# Patient Record
Sex: MALE | Employment: OTHER | URBAN - NONMETROPOLITAN AREA
[De-identification: names, ages, dates, MRNs, and addresses within clinical notes are randomized per-mention and may not be internally consistent; named-entity substitution may affect disease eponyms.]

---

## 2022-04-18 PROBLEM — E79.0 HYPERURICEMIA: Status: ACTIVE | Noted: 2018-11-15

## 2022-04-18 PROBLEM — H35.3220 EXUDATIVE AGE-RELATED MACULAR DEGENERATION OF LEFT EYE (HCC): Status: ACTIVE | Noted: 2019-04-12

## 2022-04-18 PROBLEM — C61 MALIGNANT NEOPLASM OF PROSTATE (HCC): Status: ACTIVE | Noted: 2020-10-06

## 2022-04-18 PROBLEM — R73.01 IFG (IMPAIRED FASTING GLUCOSE): Status: ACTIVE | Noted: 2021-01-04

## 2022-04-18 PROBLEM — I63.9 CVA (CEREBRAL VASCULAR ACCIDENT) (HCC): Status: ACTIVE | Noted: 2018-11-15

## 2022-04-18 PROBLEM — H40.1131 PRIMARY OPEN ANGLE GLAUCOMA (POAG) OF BOTH EYES, MILD STAGE: Status: ACTIVE | Noted: 2021-01-04

## 2022-04-18 PROBLEM — N18.4 STAGE 4 CHRONIC KIDNEY DISEASE (HCC): Status: ACTIVE | Noted: 2018-11-13

## 2022-04-18 PROBLEM — N40.1 BPH WITH OBSTRUCTION/LOWER URINARY TRACT SYMPTOMS: Status: ACTIVE | Noted: 2020-07-02

## 2022-04-18 PROBLEM — R06.09 DOE (DYSPNEA ON EXERTION): Status: ACTIVE | Noted: 2022-02-22

## 2022-04-18 PROBLEM — E78.2 MIXED HYPERLIPIDEMIA: Status: ACTIVE | Noted: 2018-06-07

## 2022-04-18 PROBLEM — I12.9 HYPERTENSIVE CHRONIC KIDNEY DISEASE WITH STAGE 1 THROUGH STAGE 4 CHRONIC KIDNEY DISEASE, OR UNSPECIFIED CHRONIC KIDNEY DISEASE: Status: ACTIVE | Noted: 2022-02-11

## 2022-04-18 PROBLEM — D50.8 IRON DEFICIENCY ANEMIA SECONDARY TO INADEQUATE DIETARY IRON INTAKE: Status: ACTIVE | Noted: 2022-02-25

## 2022-04-18 PROBLEM — G89.18 ACUTE POST-OPERATIVE PAIN: Status: ACTIVE | Noted: 2018-11-13

## 2022-04-18 PROBLEM — J43.2 CENTRILOBULAR EMPHYSEMA (HCC): Status: ACTIVE | Noted: 2022-02-22

## 2022-04-18 PROBLEM — D50.9 ANEMIA, IRON DEFICIENCY: Status: ACTIVE | Noted: 2022-02-18

## 2022-04-18 PROBLEM — H35.30 MACULAR DEGENERATION: Status: ACTIVE | Noted: 2018-11-15

## 2022-04-18 PROBLEM — R97.20 ELEVATED PROSTATE SPECIFIC ANTIGEN (PSA): Status: ACTIVE | Noted: 2020-07-02

## 2022-04-18 PROBLEM — J90 PLEURAL EFFUSION: Status: ACTIVE | Noted: 2022-02-22

## 2022-04-18 PROBLEM — G47.20 SLEEP PATTERN DISTURBANCE: Status: ACTIVE | Noted: 2021-01-04

## 2022-04-18 PROBLEM — I63.9 CEREBELLAR STROKE (HCC): Status: ACTIVE | Noted: 2021-01-04

## 2022-04-18 PROBLEM — F17.200 CURRENT SMOKER: Status: ACTIVE | Noted: 2018-11-15

## 2022-04-18 PROBLEM — I65.23 BILATERAL CAROTID ARTERY STENOSIS: Status: ACTIVE | Noted: 2021-01-04

## 2022-04-18 PROBLEM — H25.13 AGE-RELATED NUCLEAR CATARACT OF BOTH EYES: Status: ACTIVE | Noted: 2021-01-04

## 2022-04-18 PROBLEM — R60.0 EDEMA, LOWER EXTREMITY: Status: ACTIVE | Noted: 2021-11-23

## 2022-04-18 PROBLEM — D63.1 ANEMIA OF RENAL DISEASE: Status: ACTIVE | Noted: 2022-02-18

## 2022-04-18 PROBLEM — I71.40 AAA (ABDOMINAL AORTIC ANEURYSM) WITHOUT RUPTURE (HCC): Status: ACTIVE | Noted: 2018-10-12

## 2022-04-18 PROBLEM — E78.5 HYPERLIPIDEMIA: Status: ACTIVE | Noted: 2018-11-15

## 2022-04-18 PROBLEM — I73.9 PAD (PERIPHERAL ARTERY DISEASE) (HCC): Status: ACTIVE | Noted: 2018-11-15

## 2022-04-18 PROBLEM — N18.9 ANEMIA OF RENAL DISEASE: Status: ACTIVE | Noted: 2022-02-18

## 2022-04-18 PROBLEM — I10 ESSENTIAL HYPERTENSION: Status: ACTIVE | Noted: 2018-11-13

## 2022-04-18 PROBLEM — N13.8 BPH WITH OBSTRUCTION/LOWER URINARY TRACT SYMPTOMS: Status: ACTIVE | Noted: 2020-07-02

## 2022-04-18 PROBLEM — J98.11 ATELECTASIS: Status: ACTIVE | Noted: 2018-11-14

## 2022-04-18 RX ORDER — TAMSULOSIN HYDROCHLORIDE 0.4 MG/1
0.4 CAPSULE ORAL DAILY
COMMUNITY
Start: 2021-08-24

## 2022-04-18 RX ORDER — ATORVASTATIN CALCIUM 40 MG/1
40 TABLET, FILM COATED ORAL DAILY
COMMUNITY
Start: 2022-02-21

## 2022-04-18 RX ORDER — ASPIRIN 81 MG/1
81 TABLET, CHEWABLE ORAL
COMMUNITY

## 2022-04-18 RX ORDER — OLMESARTAN MEDOXOMIL, AMLODIPINE AND HYDROCHLOROTHIAZIDE TABLET 20/5/12.5 MG 20; 5; 12.5 MG/1; MG/1; MG/1
1 TABLET ORAL DAILY
COMMUNITY
Start: 2022-02-21 | End: 2022-04-21

## 2022-04-18 RX ORDER — LATANOPROST 50 UG/ML
SOLUTION/ DROPS OPHTHALMIC
COMMUNITY
Start: 2021-10-07

## 2022-04-18 RX ORDER — NEBIVOLOL 20 MG/1
20 TABLET ORAL DAILY
COMMUNITY
Start: 2021-12-20

## 2022-04-18 RX ORDER — FEBUXOSTAT 40 MG/1
40 TABLET, FILM COATED ORAL DAILY
COMMUNITY
Start: 2022-02-21

## 2022-04-18 RX ORDER — CLOPIDOGREL BISULFATE 75 MG/1
75 TABLET ORAL DAILY
COMMUNITY
Start: 2022-02-21

## 2022-04-21 ENCOUNTER — OFFICE VISIT (OUTPATIENT)
Dept: NEPHROLOGY | Age: 67
End: 2022-04-21
Payer: COMMERCIAL

## 2022-04-21 ENCOUNTER — TELEPHONE (OUTPATIENT)
Dept: NEPHROLOGY | Age: 67
End: 2022-04-21

## 2022-04-21 VITALS
TEMPERATURE: 98.2 F | SYSTOLIC BLOOD PRESSURE: 142 MMHG | BODY MASS INDEX: 25.62 KG/M2 | HEIGHT: 71 IN | OXYGEN SATURATION: 98 % | HEART RATE: 70 BPM | DIASTOLIC BLOOD PRESSURE: 64 MMHG | WEIGHT: 183 LBS

## 2022-04-21 DIAGNOSIS — N18.5 CKD (CHRONIC KIDNEY DISEASE), STAGE V (HCC): Primary | ICD-10-CM

## 2022-04-21 PROCEDURE — 99204 OFFICE O/P NEW MOD 45 MIN: CPT | Performed by: INTERNAL MEDICINE

## 2022-04-21 RX ORDER — TORSEMIDE 20 MG/1
40 TABLET ORAL DAILY
Qty: 60 TABLET | Refills: 3 | Status: SHIPPED | OUTPATIENT
Start: 2022-04-21 | End: 2022-05-23 | Stop reason: SDUPTHER

## 2022-04-21 RX ORDER — FUROSEMIDE 40 MG/1
40 TABLET ORAL DAILY
COMMUNITY
End: 2022-04-21

## 2022-04-21 RX ORDER — AMLODIPINE BESYLATE 10 MG/1
10 TABLET ORAL DAILY
COMMUNITY
Start: 2022-03-31

## 2022-04-21 NOTE — TELEPHONE ENCOUNTER
Forest Davis would like for you to send that other water pill in for Kalen Gagandeep so he has it at home if he needs it over the weekend.

## 2022-04-21 NOTE — PROGRESS NOTES
[address] [facility]  [address]  Dept: [phone]  Loc: [phone]  Outpatient Consult  [phone]  4/21/2022 1:39 PM EDT        Pt Name:    Paulette Velasco  YOB: 1955  Primary Care Physician:  Sam Booth MD     Chief Complaint:   Chief Complaint   Patient presents with   Martha American Patient     Ref Dr. Landy Davis         Background Information/Interval History:   The patient is a pleasant 74y.o. year old  male referred by Dr. Landy Davis for CKD V. [place] [facility] [place] [facility] [place] then [facility]. Creatinine has been ranging in the 4s since January and has been progressively worsening over the past several months. He has a history of chronic diastolic CHF, anemia, prostate CA s/p RT November 2011, carotid artery stenosis s/p endarterectomy 2012, and AAA s/p EVAR in 2018. He had previous duplex renal artery US which showed no renal artery stenosis. Kidneys normal size with multiple renal cysts B/L. Past urine studies showed Upc 3 grams with 3-5 RBCs. Kidney function has been worsening since his radiation treatment in November. He has been having issues with swelling and is on diuretics. He initially responded well to lasix but then stopped responding. He was started on Torsemide by his PCP recently at 20 mg daily. He took 20 mg BID x 3 days. Hasn't noticed much improvement and doesn't notice much increase in urination after taking it. He has orthopnea and sleeps in a recliner. Allergies:  Seasonal and Varenicline        Past Medical History:  No past medical history on file. Past Surgical History:  No past surgical history on file. Family History:  No family history on file.      Social History:  Social History     Socioeconomic History    Marital status: Unknown     Spouse name: Not on file    Number of children: Not on file    Years of education: Not on file    Highest education level: Not on file   Occupational History    Not on file   Tobacco Use    Smoking status: Not on file    Smokeless tobacco: Not on file   Substance and Sexual Activity    Alcohol use: Not on file    Drug use: Not on file    Sexual activity: Not on file   Other Topics Concern    Not on file   Social History Narrative    Not on file     Social Determinants of Health     Financial Resource Strain:     Difficulty of Paying Living Expenses: Not on file   Food Insecurity:     Worried About Running Out of Food in the Last Year: Not on file    [place]ana of Food in the Last Year: Not on file   Transportation Needs:     Lack of Transportation (Medical): Not on file    Lack of Transportation (Non-Medical):  Not on file   Physical Activity:     Days of Exercise per Week: Not on file    Minutes of Exercise per Session: Not on file   Stress:     Feeling of Stress : Not on file   Social Connections:     Frequency of Communication with Friends and Family: Not on file    Frequency of Social Gatherings with Friends and Family: Not on file    Attends Confucianist Services: Not on file    Active Member of [address] [place] [address] or Organizations: Not on file    Attends Club or Organization Meetings: Not on file    Marital Status: Not on file   Intimate Partner Violence:     Fear of Current or Ex-Partner: Not on file    Emotionally Abused: Not on file    Physically Abused: Not on file    Sexually Abused: Not on file   Housing Stability:     Unable to Pay for Housing in the Last Year: Not on file    Number of [place] in the Last Year: Not on file    Unstable Housing in the Last Year: Not on file        Review of Systems:  Constitutional: no fever or chills  Head: No headaches  Eyes: no blurry vision, no discharge  Ears: no ear pain or hearing changes  Nose: no runny nose or epistaxis  Respiratory: +shortness of breath  Cardiovascular: no chest pain, + edema  GI: no nausea, no vomiting or diarrhea  : denies any hematuria, no flank pain  Skin: no rash  Musculoskeletal: no joint pain, moves all ext  Neuro: no tremor, no slurred speech  Psychiatric: stable mood, no depression or insomnia     Home Medications:  Prior to Admission medications    Medication Sig Start Date End Date Taking?  Authorizing Provider   amLODIPine (NORVASC) 10 MG tablet Take 10 mg by mouth daily 3/31/22  Yes Historical Provider, MD   aspirin 81 MG chewable tablet Take 81 mg by mouth   Yes Historical Provider, MD   atorvastatin (LIPITOR) 40 MG tablet Take 40 mg by mouth daily 2/21/22  Yes Historical Provider, MD   Cholecalciferol (VITAMIN D) 10 MCG (400 UNIT) CAPS Capsule Take 400 Units by mouth daily   Yes Historical Provider, MD   clopidogrel (PLAVIX) 75 MG tablet Take 75 mg by mouth daily 2/21/22  Yes Historical Provider, MD   febuxostat (ULORIC) 40 MG TABS tablet Take 40 mg by mouth daily 2/21/22  Yes Historical Provider, MD   latanoprost (XALATAN) 0.005 % ophthalmic solution  10/7/21  Yes Historical Provider, MD   MULTIPLE VITAMINS-MINERALS PO Take 1 tablet by mouth 2 times daily   Yes Historical Provider, MD   nebivolol (BYSTOLIC) 20 MG TABS tablet Take 20 mg by mouth daily 12/20/21  Yes Historical Provider, MD   tamsulosin (FLOMAX) 0.4 MG capsule Take 0.4 mg by mouth daily 8/24/21  Yes Historical Provider, MD   iron sucrose (VENOFER) 20 MG/ML injection Infuse 200 mg intravenously once a week 3/4/22  Yes Historical Provider, MD   furosemide (LASIX) 40 MG tablet Take 40 mg by mouth daily    Historical Provider, MD   Ferrous Fumarate 325 (106 Fe) MG TABS Take by mouth  Patient not taking: Reported on 4/21/2022    Historical Provider, MD   olmesartan-amLODIPine-HCTZ 20-5-12.5 MG TABS Take 1 tablet by mouth daily  Patient not taking: Reported on 4/21/2022 2/21/22   Historical Provider, MD        Physical Examination:  VITALS:  BP (!) 142/64 (Site: Left Upper Arm, Position: Sitting, Cuff Size: Large Adult)   Pulse 70   Temp 98.2 °F (36.8 °C) (Temporal) Ht 5' 11\" (1.803 m)   Wt 183 lb (83 kg)   SpO2 98%   BMI 25.52 kg/m²   Body mass index is 25.52 kg/m². Wt Readings from Last 3 Encounters:   04/21/22 183 lb (83 kg)     Constitutional and General Appearance: alert and cooperative with exam, appears comfortable, no distress  Eyes: no icteric sclera, no pallor conjunctiva, no discharge seen from either eye  Ears and Nose: normal external appearance of left and right ear and nose. No active drainage from nose. Oral: moist oral mucus membranes  Neck: No jugular venous distention, appears symmetric, good ROM  Lungs: Air entry B/L, no crackles or rales, no use of accessory muscles  Heart: regular rate, S1, S2  Extremities: 1+ LE edema  GI: soft, non-tender, +left sided abdominal bulge  Skin: no rash seen on exposed extremities  Musculo: moves all extremities  Neuro: no slurred speech, no facial drooping, no asterixis  Psychiatric: Awake, alert, Oriented     Laboratory & Diagnostics:  CBC: No results found for: WBC, HGB, HCT, MCV, PLT  BMP:  No results found for: NA, K, CL, CO2, BUN, CREATININE, GLUCOSE   Hepatic: No results found for: AST, ALT, ALB, BILITOT, ALKPHOS  BNP: No results found for: BNP  Lipids: No results found for: CHOL, HDL  INR: No results found for: INR  URINE: No results found for: NAUR, PROTUR  No results found for: NITRU, COLORU, PHUR, LABCAST, WBCUA, RBCUA, MUCUS, TRICHOMONAS, YEAST, BACTERIA, CLARITYU, SPECGRAV, LEUKOCYTESUR, UROBILINOGEN, BILIRUBINUR, BLOODU, GLUCOSEU, KETUA, AMORPHOUS   Microalbumen/Creatinine ratio:  No components found for: RUCREAT        Impression/Plan:   1. CKD V with 3 grams proteinuria  -he is not diabetic. Suspect he has an underlying chronic GN. Has never had a kidney biopsy but at this point is probably too late in his disease course for a biopsy to change the course of treatment. Will send serologies  -will refer patient for transplant evaluation and also dialysis education. He is nearing dialysis.  Would be a good candidate for PD    2. Volume overload: I think he needs higher dose of loop diuretic for effect. Increase torsemide to 40 mg daily. Call in 1 week   3. Anemia: received IV iron, Tsat was 8%. Has not had colonoscopy. Also received Aranesp injections last Hgb was 10.4 did not need. Check labs every 2 weeks  4. HTN  5. AAA, KAIDEN  6. Hx prostate CA    Return in 6 weeks. Thought process was discussed with the patient.   Thank you for the referral.  Please do not hesitate to contact me if you have any questions or concerns        Melva Hanna, DO  [facility]

## 2022-04-25 ENCOUNTER — TELEPHONE (OUTPATIENT)
Dept: NEPHROLOGY | Age: 67
End: 2022-04-25

## 2022-04-25 NOTE — TELEPHONE ENCOUNTER
Stephanie Jaimes (wife) called she said that Ramon Begun is about the same as when you seen him on Thursday with the Torsemide. His swelling did not go away. It goes down at night but comes back during the day. His breathing is a little better but not really. Should he just keep trying the torsemide or are you going to send in the other medication that you had talked about on Thursday?

## 2022-04-25 NOTE — TELEPHONE ENCOUNTER
Is he peeing more with the 40 mg dose?   For now I want him to take it as 40 mg BID, call at end of the week with an update

## 2022-04-29 ENCOUNTER — TELEPHONE (OUTPATIENT)
Dept: NEPHROLOGY | Age: 67
End: 2022-04-29

## 2022-04-29 RX ORDER — METOLAZONE 5 MG/1
5 TABLET ORAL DAILY
Qty: 10 TABLET | Refills: 0 | Status: SHIPPED | OUTPATIENT
Start: 2022-04-29 | End: 2022-06-06

## 2022-04-29 NOTE — TELEPHONE ENCOUNTER
Would like to know weights and if he is urinating more with the higher dose of torsemide. Will give metolazone 5 mg daily to take x 2 days.    Will send to pharmacy and give him 10 tablets but for now just take it for 2 days

## 2022-04-29 NOTE — TELEPHONE ENCOUNTER
I did ask for weight and all she said was he was down a pound. I advised her to have him do daily weights. Shalini Bowen said she does not know if he has really urinated more. I did inform her to take metolazone 5 mg for 2 days only and call me on Monday with a log of weights.  Send to [facility]

## 2022-04-29 NOTE — TELEPHONE ENCOUNTER
Wife called with update on how patient was doing with the torsemide. Breathing is better. LLE still swollen and has a hard time putting shoes on.

## 2022-05-02 NOTE — TELEPHONE ENCOUNTER
Wife called in regards to weights:  4/29 175.5  4/28 174  5/01 171.8  5/02 168.8    She states pt is breathing better. His swelling is down and he can now get his shoes on. She states pt is still taking torsemide 40 mg twice a day. Do you want to do anything differently?

## 2022-05-05 ENCOUNTER — TELEPHONE (OUTPATIENT)
Dept: NEPHROLOGY | Age: 67
End: 2022-05-05

## 2022-05-05 DIAGNOSIS — N18.5 CKD (CHRONIC KIDNEY DISEASE), STAGE V (HCC): Primary | ICD-10-CM

## 2022-05-05 LAB
BUN BLDV-MCNC: 103 MG/DL
BUN BLDV-MCNC: 103 MG/DL (ref 7–22)
CALCIUM SERPL-MCNC: 8.9 MG/DL
CALCIUM SERPL-MCNC: 8.9 MG/DL (ref 8.4–10.2)
CHLORIDE BLD-SCNC: 89 MEQ/L (ref 98–107)
CHLORIDE BLD-SCNC: 89 MMOL/L
CO2: 30 MEQ/L (ref 22–31)
CO2: 30 MMOL/L
CREAT SERPL-MCNC: 5 MG/DL
CREAT SERPL-MCNC: 5 MG/DL (ref 0.4–1.1)
GFR CALCULATED: 12
GFR SERPL CREATININE-BSD FRML MDRD: 12 ML/MIN/{1.73_M2}
GLUCOSE BLD-MCNC: 102 MG/DL
GLUCOSE: 102 MG/DL (ref 70–126)
HCT VFR BLD CALC: 30.7 % (ref 42–52)
HEMOGLOBIN: 10.3 G/DL (ref 14–18)
MCH RBC QN AUTO: 28.4 PG (ref 28–32)
MCHC RBC AUTO-ENTMCNC: 33.7 G/DL (ref 33–37)
MCV RBC AUTO: 84.3 FL (ref 80–94)
PDW BLD-RTO: 15.9 % (ref 11.5–14.5)
PLATELET # BLD: 242 THOU/CUMM (ref 130–400)
POTASSIUM SERPL-SCNC: 4.4 MEQ/L (ref 3.5–5.1)
POTASSIUM SERPL-SCNC: 4.4 MMOL/L
RBC: 3.64 MIL/CUMM (ref 4.7–6.1)
SODIUM BLD-SCNC: 131 MEQ/L (ref 136–145)
SODIUM BLD-SCNC: 131 MMOL/L
WBC: 6.1 THOU/CUMM (ref 4.8–10.8)

## 2022-05-05 NOTE — TELEPHONE ENCOUNTER
----- Message from Jazmin Lo DO sent at 5/5/2022  4:09 PM EDT -----  We need to back off the diuretics for now. Creatinine up to 5 and BUN in 100s. Reduce Torsemide to daily.    Bmp next week  ----- Message -----  From: Brian Ruiz In [facility]  Sent: 5/5/2022   1:19 PM EDT  To: Jazmin Lo DO

## 2022-05-11 ENCOUNTER — TELEPHONE (OUTPATIENT)
Dept: NEPHROLOGY | Age: 67
End: 2022-05-11

## 2022-05-11 LAB
BUN BLDV-MCNC: 98 MG/DL
CALCIUM SERPL-MCNC: 9.2 MG/DL
CHLORIDE BLD-SCNC: 97 MMOL/L
CO2: 27 MMOL/L
CREAT SERPL-MCNC: 4.8 MG/DL
GFR CALCULATED: 12
GLUCOSE BLD-MCNC: 104 MG/DL
POTASSIUM SERPL-SCNC: 4.8 MMOL/L
SODIUM BLD-SCNC: 133 MMOL/L

## 2022-05-11 NOTE — TELEPHONE ENCOUNTER
Mila Inman from [facility] phoned. Prior to us seeing this patient Dr. Sin Veliz was ordering Aranesp, using standing orders. He had labs today, Hgb 10, Hct 29.9 and is scheduled for blood work again on 5.25.22. Now that patient has you as a nephrologist they want to make sure you will be the one to follow his labs and order Aranesp when needed. His next appt with you is 6.6.22  Komal's phone # [phone] ext.  [phone]

## 2022-05-11 NOTE — TELEPHONE ENCOUNTER
Spoke to eCur[employer] at Dr. Bautista Linear office and patient had been getting 60 mcg q 2 weeks if hgb was below 10. I did inform her we will manage aranesp.

## 2022-05-12 DIAGNOSIS — D63.1 ANEMIA DUE TO STAGE 5 CHRONIC KIDNEY DISEASE (HCC): Primary | ICD-10-CM

## 2022-05-12 DIAGNOSIS — N18.5 ANEMIA DUE TO STAGE 5 CHRONIC KIDNEY DISEASE (HCC): Primary | ICD-10-CM

## 2022-05-23 RX ORDER — TORSEMIDE 20 MG/1
40 TABLET ORAL DAILY
Qty: 60 TABLET | Refills: 3 | Status: SHIPPED | OUTPATIENT
Start: 2022-05-23 | End: 2022-06-06 | Stop reason: SDUPTHER

## 2022-05-25 ENCOUNTER — TELEPHONE (OUTPATIENT)
Dept: NEPHROLOGY | Age: 67
End: 2022-05-25

## 2022-05-25 LAB
BASOPHILS # BLD: 1.4 % (ref 0–3)
BASOPHILS ABSOLUTE: ABNORMAL
BASOPHILS RELATIVE PERCENT: 1.4 %
BUN BLDV-MCNC: 76 MG/DL
BUN BLDV-MCNC: 76 MG/DL (ref 7–22)
CALCIUM SERPL-MCNC: 9.1 MG/DL
CALCIUM SERPL-MCNC: 9.1 MG/DL (ref 8.4–10.2)
CHLORIDE BLD-SCNC: 100 MEQ/L (ref 98–107)
CHLORIDE BLD-SCNC: 100 MMOL/L
CO2: 27 MEQ/L (ref 22–31)
CO2: 27 MMOL/L
CREAT SERPL-MCNC: 4.3 MG/DL
CREAT SERPL-MCNC: 4.3 MG/DL (ref 0.4–1.1)
EOSINOPHIL # BLD: 9.3 % (ref 0–4)
EOSINOPHILS ABSOLUTE: ABNORMAL
EOSINOPHILS RELATIVE PERCENT: 9.3 %
GFR CALCULATED: 14
GFR SERPL CREATININE-BSD FRML MDRD: 14 ML/MIN/{1.73_M2}
GLUCOSE BLD-MCNC: 100 MG/DL
GLUCOSE: 100 MG/DL (ref 70–126)
HCT VFR BLD CALC: 28.9 % (ref 41–53)
HCT VFR BLD CALC: 28.9 % (ref 42–52)
HEMOGLOBIN: 9.7 G/DL (ref 13.5–17.5)
HEMOGLOBIN: 9.7 G/DL (ref 14–18)
LYMPHOCYTES # BLD: 10.5 % (ref 17.6–49.6)
LYMPHOCYTES ABSOLUTE: ABNORMAL
LYMPHOCYTES RELATIVE PERCENT: 10.5 %
MCH RBC QN AUTO: 28.1 PG
MCH RBC QN AUTO: 28.1 PG (ref 28–32)
MCHC RBC AUTO-ENTMCNC: 33.4 G/DL
MCHC RBC AUTO-ENTMCNC: 33.4 G/DL (ref 33–37)
MCV RBC AUTO: 84.1 FL
MCV RBC AUTO: 84.1 FL (ref 80–94)
MONOCYTES # BLD: 11.8 % (ref 4.1–12.4)
MONOCYTES ABSOLUTE: ABNORMAL
MONOCYTES RELATIVE PERCENT: 11.8 %
NEUTROPHILS ABSOLUTE: ABNORMAL
NEUTROPHILS RELATIVE PERCENT: 67 %
PDW BLD-RTO: 16.1 % (ref 11.5–14.5)
PLATELET # BLD: 214 K/ΜL
PLATELET # BLD: 214 THOU/CUMM (ref 130–400)
PMV BLD AUTO: ABNORMAL FL
POTASSIUM SERPL-SCNC: 5.3 MEQ/L (ref 3.5–5.1)
POTASSIUM SERPL-SCNC: 5.3 MMOL/L
RBC # BLD: 3.44 10^6/ΜL
RBC: 3.44 MIL/CUMM (ref 4.7–6.1)
SCAN OF BLOOD SMEAR: NO
SEG NEUTROPHILS: 67 % (ref 39.4–72.5)
SODIUM BLD-SCNC: 136 MEQ/L (ref 136–145)
SODIUM BLD-SCNC: 136 MMOL/L
WBC # BLD: 5.6 10^3/ML
WBC: 5.6 THOU/CUMM (ref 4.8–10.8)

## 2022-05-25 NOTE — TELEPHONE ENCOUNTER
----- Message from Author Alexander DO sent at 5/25/2022  1:24 PM EDT -----  He should be receiving Aranesp since Hgb <10. Can you confirm they have the order?   I think it was being given through PCP's office maybe?  ----- Message -----  From: Brian Ruiz In [facility]  Sent: 5/25/2022  10:39 AM EDT  To: Author Alexander DO

## 2022-05-26 NOTE — TELEPHONE ENCOUNTER
Spouse called and states he had Aranesp yesterday. He will go in another 2 weeks to get CBC drawn and another Aranesp injection if needed.

## 2022-06-03 LAB
C3 COMPLEMENT: 91 MG/DL (ref 90–180)
COMPLEMENT COMPONENT 4: 21 MG/DL (ref 10–40)
CREATININE URINE: 70.7 MG/DL
HEPATITIS B SURFACE ANTIGEN: NEGATIVE
HEPATITIS C ANTIBODY: NEGATIVE
PARATHYROID HORMONE INTACT: 61.1 PG/ML (ref 15–65)
PHOSPHORUS: 4.5 MG/DL (ref 2.5–4.6)
PROTEIN, URINE: 140 MG/DL (ref 0–10)
VITAMIN D 25-HYDROXY: 24 NG/ML (ref 30–100)

## 2022-06-04 LAB
KAPPA QUANT FREE LIGHT CHAINS: 130.69 MG/L (ref 3.3–19.4)
KAPPA/LAMBDA FREE LIGHT CHAINS: 1.13 (ref 0.26–1.65)
LAMBDA FREE LIGHT CHAINS QNT: 116.04 MG/L (ref 5.71–26.3)

## 2022-06-05 LAB — ANA SCREEN: NORMAL

## 2022-06-06 ENCOUNTER — OFFICE VISIT (OUTPATIENT)
Dept: NEPHROLOGY | Age: 67
End: 2022-06-06
Payer: COMMERCIAL

## 2022-06-06 VITALS
OXYGEN SATURATION: 98 % | HEART RATE: 59 BPM | SYSTOLIC BLOOD PRESSURE: 151 MMHG | DIASTOLIC BLOOD PRESSURE: 66 MMHG | WEIGHT: 172 LBS | BODY MASS INDEX: 23.99 KG/M2

## 2022-06-06 DIAGNOSIS — N18.5 ANEMIA DUE TO STAGE 5 CHRONIC KIDNEY DISEASE (HCC): Primary | ICD-10-CM

## 2022-06-06 DIAGNOSIS — D63.1 ANEMIA DUE TO STAGE 5 CHRONIC KIDNEY DISEASE (HCC): Primary | ICD-10-CM

## 2022-06-06 DIAGNOSIS — N18.5 CKD (CHRONIC KIDNEY DISEASE), STAGE V (HCC): ICD-10-CM

## 2022-06-06 PROCEDURE — 99214 OFFICE O/P EST MOD 30 MIN: CPT | Performed by: INTERNAL MEDICINE

## 2022-06-06 PROCEDURE — 1123F ACP DISCUSS/DSCN MKR DOCD: CPT | Performed by: INTERNAL MEDICINE

## 2022-06-06 RX ORDER — TORSEMIDE 20 MG/1
40 TABLET ORAL 2 TIMES DAILY
Qty: 360 TABLET | Refills: 1 | Status: SHIPPED | OUTPATIENT
Start: 2022-06-06 | End: 2022-10-12

## 2022-06-06 NOTE — PROGRESS NOTES
[address] [facility]  [address]  [facility] [address]  Dept: [phone]  Loc: [phone]  Progress Note  6/6/2022 2:25 PM      Pt Name:    Rayshawn Garcia  YOB: 1955  Primary Care Physician:  Mary Banks MD     Chief Complaint:   Chief Complaint   Patient presents with    Follow-up     CKD V         History of Present Illness: This is a follow-up visit for CKD V. He has a history of chronic diastolic CHF, anemia, prostate CA s/p RT November 2021, carotid artery stenosis s/p endarterectomy 2012, and AAA s/p EVAR in 2018.      He had previous duplex renal artery US which showed no renal artery stenosis. Kidneys normal size with multiple renal cysts B/L. Accompanied by his wife. He is doing ok. Swelling was better on torsemide 40 mg BID, we reduced to daily and it is starting to come back again but overall much better than before. Weight down 11 pounds. Hasn't been eating the best. Denies nausea or vomiting. He is tired. Has not had initial transplant appt yet. Is supposed to be In September at [facility] and they are waiting to hear back from [address] [place] [address]. Pertinent items are noted in HPI. Past History:  No past medical history on file. No past surgical history on file. VITALS:  BP (!) 151/66 (Site: Right Upper Arm, Position: Sitting, Cuff Size: Large Adult)   Pulse 59   Wt 172 lb (78 kg)   SpO2 98%   BMI 23.99 kg/m²   Wt Readings from Last 3 Encounters:   06/06/22 172 lb (78 kg)   04/21/22 183 lb (83 kg)     Body mass index is 23.99 kg/m².      General Appearance: alert and cooperative with exam, appears comfortable, no distress  HEENT: EOMI, moist oral mucus membranes  Neck: No jugular venous distention,  Lungs: Air entry B/L, no crackles or rales, no use of accessory muscles  Heart: S1, S2 heard, no rub  GI: soft, non-tender, no guarding  Extremities: 1+ LE edema  Skin: warm, dry  Neurologic: no tremor, no asterixis, no focal neurologic deficits     Medications:  Current Outpatient Medications   Medication Sig Dispense Refill    torsemide (DEMADEX) 20 MG tablet Take 2 tablets by mouth in the morning and at bedtime 360 tablet 1    amLODIPine (NORVASC) 10 MG tablet Take 10 mg by mouth daily      aspirin 81 MG chewable tablet Take 81 mg by mouth      atorvastatin (LIPITOR) 40 MG tablet Take 40 mg by mouth daily      Cholecalciferol (VITAMIN D) 10 MCG (400 UNIT) CAPS Capsule Take 400 Units by mouth daily      clopidogrel (PLAVIX) 75 MG tablet Take 75 mg by mouth daily      febuxostat (ULORIC) 40 MG TABS tablet Take 40 mg by mouth daily      latanoprost (XALATAN) 0.005 % ophthalmic solution       MULTIPLE VITAMINS-MINERALS PO Take 1 tablet by mouth 2 times daily      nebivolol (BYSTOLIC) 20 MG TABS tablet Take 20 mg by mouth daily      tamsulosin (FLOMAX) 0.4 MG capsule Take 0.4 mg by mouth daily      iron sucrose (VENOFER) 20 MG/ML injection Infuse 200 mg intravenously once a week       No current facility-administered medications for this visit.         Laboratory & Diagnostics:  CBC:   Lab Results   Component Value Date    WBC 5.6 05/25/2022    HGB 9.7 (L) 05/25/2022    HCT 28.9 (L) 05/25/2022    MCV 84.1 05/25/2022     05/25/2022     BMP:    Lab Results   Component Value Date     05/25/2022     05/25/2022     (L) 05/11/2022    K 5.3 (H) 05/25/2022    K 5.3 05/25/2022    K 4.8 05/11/2022     05/25/2022     05/25/2022    CL 97 (L) 05/11/2022    CO2 27 05/25/2022    CO2 27 05/25/2022    CO2 27 05/11/2022    BUN 76 (H) 05/25/2022    BUN 76 05/25/2022    BUN 98 (H) 05/11/2022    CREATININE 4.3 (HH) 05/25/2022    CREATININE 4.3 05/25/2022    CREATININE 4.8 (HH) 05/11/2022    GLUCOSE 100 05/25/2022    GLUCOSE 100 05/25/2022    GLUCOSE 104 05/11/2022      Hepatic: No results found for: AST, ALT, ALB, BILITOT, ALKPHOS  BNP: No results found for: BNP  Lipids: No results found for: CHOL, HDL  INR: No results found for: INR  URINE:   Lab Results   Component Value Date    PROTUR 140 06/03/2022     No results found for: NITRU, COLORU, PHUR, LABCAST, WBCUA, RBCUA, MUCUS, TRICHOMONAS, YEAST, BACTERIA, CLARITYU, SPECGRAV, LEUKOCYTESUR, UROBILINOGEN, BILIRUBINUR, BLOODU, GLUCOSEU, KETUA, AMORPHOUS   Microalbumen/Creatinine ratio:  No components found for: RUCREAT        Impression/Plan:     1. CKD V , progressive with subnephrotic proteinuria, likely nephrosclerosis. Serologies negative  -has been referred for transplant  -prefers PD  -increase torsemide to 40 mg BID to help volume status better  -discussed symptoms of worsening uremia and pt to call if having any worsening symptoms       2. Volume overload: increase torsemide back to 40 mg BID  3. Anemia: gest Aranesp 60 mcg q 2 weeks if hgb < 10  4. Hyperkalemia 5.3 discussed low K diet. Should improve with higher torsemide  5. Vit d deficiency on replacement  6. HTN  7. AAA, KAIDEN  8. Hx prostate CA    Bloodwork and medications were reviewed and plan of care discussed with the patient. Return to clinic in 6 weeks  or sooner if the need arises.       Gallo Doom, DO  [facility]

## 2022-06-06 NOTE — PATIENT INSTRUCTIONS
Increase torsemide to 40 mg twice daily. Low Potassium Diet:        Foods are low in potassium. Plan to eat these every day. But don't eat more than 4 servings a day. A serving equals 1 small piece of fruit or ½ cup.  Fresh fruit: Apples, applesauce, blueberries, grapes, pineapple, plums, watermelon    Canned fruit: Peaches and pears.  Vegetables: Green or wax beans,broccoli, cabbage, carrots, corn, lettuce, radishes, green peas spinach.  Cheese, Pasta, rice, bread     Foods are high in potassium. Don't eat more than 1 serving of these a day. A serving equals 1 small piece of fruit or ½ cup.  Fresh fruit: Blackberries, boysenberries, cherries, grapefruit, strawberries   Vegetables: Asparagus, carrots, beets, corn, turnips, canned tomatoes, white mushrooms, and zucchini.  Milk and yogurt. Don't eat more than 1 cup a day.  Foods are very high in potassium. Avoid these foods.  Fruits: Apricots, bananas, dates, figs, honeydew, cantaloupe, raisins, prunes, kiwi fruit, nectarines, oranges, and orange juice, watermelon   Vegetables: Avocados, artichokes, brussels sprouts, potatoes, leafy  green vegetables (such as spinach), winter squash, fresh tomatoes, tomato paste, yams, and dried peas, beans, and lentils.  Clams, chocolate, nuts, seeds, molasses, and sardines. Lower potassium in potatoes by \"leaching\". Boil the potatoes in water and then drain the liquid off. Repeat the rinse twice. Alesia Amezcua Do not use     salt substitute or \"lite\" salt,     Mariano Lite Salt    No Salt, Nu Salt.   These often are very high in potassium.       Mrs Miguel Valencia is ok to use since it does not contain potassium      Potassium Content of Foods   (listing by high to low content)    Food  Serving Size Potassium (mg)   Baked potato (flesh only) one medium 610   Sweet potato, baked one medium 542   Banana, raw  one medium 422   Spinach, cooked ½ C 420   Cazares beans, cooked ½ C 373   Kidney beans, cooked ½ C 371 Lentils, cooked ½ C 366   Navy beans, cooked ½ C 354   Plums, dried, pitted five 350   Artichokes, cooked one medium 343   Mashed potatoes ½ C 343   Edamame/soybeans, green ½ C 338   Tomato, canned  ½ C 325   Raisins ¼ C 299   Tomato, raw one medium 292   Papaya one small 286   Peach one medium 285   Pistachios, dry roasted, salted  1 oz (47 nuts) 285   Pumpkin, cooked, mashed ½ C 282   French fries 10 fries 278   Mushrooms, white, cooked ½ C 278   Beets, cooked ½ C 259   Nova sprouts, cooked ½ C 247   Kiwi one medium 237   Orange, raw one medium 237   Green peas, cooked ½ C 217   Cantaloupe, raw ½ C 214   Pear, raw one medium 212   Almonds, dry roasted 1 oz (24 nuts) 202   Apricot, canned, juice pack ½ C 202   Asparagus, cooked ½ C 202   [place] squash, cooked ½ C 201   Apple, raw with skin one medium 195   Honeydew ½ C 194   Carrots, cooked ½ C 183   Onions, cooked ½ C 175   Spinach, raw 1 C 167   Corn, sweet yellow ½ C 163   Red yusuf pepper ½ C 157   Kale, cooked ½ C 150   Cabbage, cooked ½ C 147   Mustard greens, cooked ½ C 142   Artur ½ C 139   Figs, dried two figs 134   Summer squash, cooked ½ C 126   Grapes 10 grapes 120   Okra, cooked ½ C 108   Bamboo shoots, canned 1 C 108   Celery, raw one stalk 105   Peanut butter, creamy 1 Tbsp 104   Green beans, cooked ½ C 104   Cauliflower, cooked ½ C 91   Mushrooms, shitake, cooked ½ C 88   Watermelon ½ C 85   Cucumber, peeled ½ C 88   Iceberg lettuce 1 C 81   Tomato, sun dried one piece 80   Eggplant, cooked ½ C 69   Pickle one pickle 61   Ketchup 1 Tbsp 60   Radishes one radish 57     5   C=cup, mg=milligrams, oz=ounces, Tbsp=tablespoon    Reference  [place] Dept of Agriculture, Agricultural Research Service, [employer]  [employer] Database for Standard Reference, Release 25: Potassium, K (mg) content of selected foods per common measure

## 2022-06-07 LAB
ALBUMIN: 3.54 G/DL (ref 3.75–5.01)
ALPHA 1 GLOBULIN: 0.46 G/DL (ref 0.19–0.46)
ALPHA 2 GLOBULIN: 0.8 G/DL (ref 0.48–1.05)
BETA GLOBULIN: 0.76 G/DL (ref 0.48–1.1)
EER IMMUNOFIX ELECTROPHORESIS SERUM: NORMAL
GAMMA: 1.14 G/DL (ref 0.62–1.51)
IMMUNOFIXATION ELECTROPHORESIS: NORMAL
PROTEIN ELECTROPHORESIS, SERUM: ABNORMAL
SPE/IFE INTERPRETATION: ABNORMAL
TOTAL PROTEIN, SERUM: 6.7 G/DL (ref 6.3–8.2)

## 2022-06-08 LAB
BASOPHILS # BLD: 1.3 % (ref 0–3)
EOSINOPHIL # BLD: 8.8 % (ref 0–4)
HCT VFR BLD CALC: 29.6 % (ref 42–52)
HEMOGLOBIN: 9.9 G/DL (ref 14–18)
LYMPHOCYTES # BLD: 8 % (ref 17.6–49.6)
MCH RBC QN AUTO: 28.5 PG (ref 28–32)
MCHC RBC AUTO-ENTMCNC: 33.6 G/DL (ref 33–37)
MCV RBC AUTO: 84.9 FL (ref 80–94)
MONOCYTES # BLD: 10.8 % (ref 4.1–12.4)
PDW BLD-RTO: 17 % (ref 11.5–14.5)
PLATELET # BLD: 192 THOU/CUMM (ref 130–400)
RBC: 3.49 MIL/CUMM (ref 4.7–6.1)
SCAN OF BLOOD SMEAR: NO
SEG NEUTROPHILS: 71.1 % (ref 39.4–72.5)
WBC: 5.4 THOU/CUMM (ref 4.8–10.8)

## 2022-06-22 LAB
HCT VFR BLD CALC: 31.3 % (ref 42–52)
HEMOGLOBIN: 10.6 G/DL (ref 14–18)
MCH RBC QN AUTO: 28.4 PG (ref 28–32)
MCHC RBC AUTO-ENTMCNC: 33.9 G/DL (ref 33–37)
MCV RBC AUTO: 83.8 FL (ref 80–94)
PDW BLD-RTO: 17.1 % (ref 11.5–14.5)
PLATELET # BLD: 220 THOU/CUMM (ref 130–400)
RBC: 3.73 MIL/CUMM (ref 4.7–6.1)
WBC: 5.2 THOU/CUMM (ref 4.8–10.8)

## 2022-07-08 LAB
BASOPHILS # BLD: 1.7 % (ref 0–3)
EOSINOPHIL # BLD: 12.3 % (ref 0–4)
HCT VFR BLD CALC: 30.4 % (ref 42–52)
HEMOGLOBIN: 10.2 G/DL (ref 14–18)
LYMPHOCYTES # BLD: 11.6 % (ref 17.6–49.6)
MCH RBC QN AUTO: 28.3 PG (ref 28–32)
MCHC RBC AUTO-ENTMCNC: 33.6 G/DL (ref 33–37)
MCV RBC AUTO: 84.3 FL (ref 80–94)
MONOCYTES # BLD: 10.6 % (ref 4.1–12.4)
PDW BLD-RTO: 17.1 % (ref 11.5–14.5)
PLATELET # BLD: 197 THOU/CUMM (ref 130–400)
RBC: 3.61 MIL/CUMM (ref 4.7–6.1)
SCAN OF BLOOD SMEAR: NO
SEG NEUTROPHILS: 63.8 % (ref 39.4–72.5)
WBC: 5.8 THOU/CUMM (ref 4.8–10.8)

## 2022-07-21 LAB
BASOPHILS ABSOLUTE: ABNORMAL
BASOPHILS RELATIVE PERCENT: ABNORMAL
BUN BLDV-MCNC: 96 MG/DL
BUN BLDV-MCNC: 96 MG/DL (ref 7–22)
CALCIUM SERPL-MCNC: 9 MG/DL
CALCIUM SERPL-MCNC: 9 MG/DL (ref 8.4–10.2)
CHLORIDE BLD-SCNC: 103 MEQ/L (ref 98–107)
CHLORIDE BLD-SCNC: 103 MMOL/L
CO2: 24 MEQ/L (ref 22–31)
CO2: 24 MMOL/L
CREAT SERPL-MCNC: 3.8 MG/DL
CREAT SERPL-MCNC: 3.8 MG/DL (ref 0.4–1.1)
EOSINOPHILS ABSOLUTE: ABNORMAL
EOSINOPHILS RELATIVE PERCENT: ABNORMAL
GFR CALCULATED: 16
GFR SERPL CREATININE-BSD FRML MDRD: 16 ML/MIN/{1.73_M2}
GLUCOSE BLD-MCNC: 95 MG/DL
GLUCOSE: 95 MG/DL (ref 70–126)
HCT VFR BLD CALC: 30.3 % (ref 41–53)
HCT VFR BLD CALC: 30.3 % (ref 42–52)
HEMOGLOBIN: 10.2 G/DL (ref 13.5–17.5)
HEMOGLOBIN: 10.2 G/DL (ref 14–18)
LYMPHOCYTES ABSOLUTE: ABNORMAL
LYMPHOCYTES RELATIVE PERCENT: ABNORMAL
MCH RBC QN AUTO: 28.6 PG
MCH RBC QN AUTO: 28.6 PG (ref 28–32)
MCHC RBC AUTO-ENTMCNC: 33.8 G/DL
MCHC RBC AUTO-ENTMCNC: 33.8 G/DL (ref 33–37)
MCV RBC AUTO: 84.6 FL
MCV RBC AUTO: 84.6 FL (ref 80–94)
MONOCYTES ABSOLUTE: ABNORMAL
MONOCYTES RELATIVE PERCENT: ABNORMAL
NEUTROPHILS ABSOLUTE: ABNORMAL
NEUTROPHILS RELATIVE PERCENT: ABNORMAL
PDW BLD-RTO: 17.5 % (ref 11.5–14.5)
PLATELET # BLD: 182 K/ΜL
PLATELET # BLD: 182 THOU/CUMM (ref 130–400)
PMV BLD AUTO: ABNORMAL FL
POTASSIUM SERPL-SCNC: 4.5 MEQ/L (ref 3.5–5.1)
POTASSIUM SERPL-SCNC: 4.5 MMOL/L
RBC # BLD: 3.58 10^6/ΜL
RBC: 3.58 MIL/CUMM (ref 4.7–6.1)
SODIUM BLD-SCNC: 137 MEQ/L (ref 136–145)
SODIUM BLD-SCNC: 137 MMOL/L
WBC # BLD: 6.3 10^3/ML
WBC: 6.3 THOU/CUMM (ref 4.8–10.8)

## 2022-07-27 LAB
BUN BLDV-MCNC: 85 MG/DL (ref 7–22)
CALCIUM SERPL-MCNC: 8.9 MG/DL (ref 8.4–10.2)
CHLORIDE BLD-SCNC: 101 MEQ/L (ref 98–107)
CO2: 26 MEQ/L (ref 22–31)
CREAT SERPL-MCNC: 4 MG/DL (ref 0.4–1.1)
GFR SERPL CREATININE-BSD FRML MDRD: 15 ML/MIN/{1.73_M2}
GLUCOSE: 93 MG/DL (ref 70–126)
HCT VFR BLD CALC: 30.4 % (ref 42–52)
HEMOGLOBIN: 10.5 G/DL (ref 14–18)
IRON: 46 UG/DL (ref 65–175)
MCH RBC QN AUTO: 29.3 PG (ref 28–32)
MCHC RBC AUTO-ENTMCNC: 34.5 G/DL (ref 33–37)
MCV RBC AUTO: 85.1 FL (ref 80–94)
PDW BLD-RTO: 18 % (ref 11.5–14.5)
PHOSPHORUS: 4.6 MG/DL (ref 2.5–4.6)
PLATELET # BLD: 212 THOU/CUMM (ref 130–400)
POTASSIUM SERPL-SCNC: 5 MEQ/L (ref 3.5–5.1)
RBC: 3.57 MIL/CUMM (ref 4.7–6.1)
SODIUM BLD-SCNC: 135 MEQ/L (ref 136–145)
WBC: 5.7 THOU/CUMM (ref 4.8–10.8)

## 2022-08-02 ENCOUNTER — OFFICE VISIT (OUTPATIENT)
Dept: NEPHROLOGY | Age: 67
End: 2022-08-02
Payer: COMMERCIAL

## 2022-08-02 VITALS
HEART RATE: 70 BPM | WEIGHT: 171 LBS | OXYGEN SATURATION: 98 % | DIASTOLIC BLOOD PRESSURE: 71 MMHG | BODY MASS INDEX: 23.85 KG/M2 | SYSTOLIC BLOOD PRESSURE: 146 MMHG

## 2022-08-02 DIAGNOSIS — N18.5 CKD (CHRONIC KIDNEY DISEASE), STAGE V (HCC): Primary | ICD-10-CM

## 2022-08-02 PROCEDURE — 1123F ACP DISCUSS/DSCN MKR DOCD: CPT | Performed by: INTERNAL MEDICINE

## 2022-08-02 PROCEDURE — 99214 OFFICE O/P EST MOD 30 MIN: CPT | Performed by: INTERNAL MEDICINE

## 2022-08-02 NOTE — PROGRESS NOTES
[address] [facility]  [address]  [facility] [address]  Dept: [phone]  Loc: [phone]  Progress Note  8/2/2022 3:34 PM      Pt Name:    Indira Ramires  YOB: 1955  Primary Care Physician:  Tiki Maddox MD     Chief Complaint:   Chief Complaint   Patient presents with    Follow-up     CKD V        History of Present Illness: This is a follow-up visit for CKD V. He has a history of chronic diastolic CHF, anemia, prostate CA s/p RT November 2021, carotid artery stenosis s/p endarterectomy 2012, and AAA s/p EVAR in 2018. He had previous duplex renal artery US which showed no renal artery stenosis. Kidneys normal size with multiple renal cysts B/L. Accompanied by his wife. Feeling ok, better than previous. Swelling stable, he reduced his torsemide to 20 mg daily on his own and weights have been stable. Good appetite. Energy level ok. Sees transplant for evaluation in September. Pertinent items are noted in HPI. Past History:  No past medical history on file. No past surgical history on file. VITALS:  BP (!) 146/71 (Site: Left Upper Arm, Position: Sitting, Cuff Size: Large Adult)   Pulse 70   Wt 171 lb (77.6 kg)   SpO2 98%   BMI 23.85 kg/m²   Wt Readings from Last 3 Encounters:   08/02/22 171 lb (77.6 kg)   06/06/22 172 lb (78 kg)   04/21/22 183 lb (83 kg)     Body mass index is 23.85 kg/m².      General Appearance: alert and cooperative with exam, appears comfortable, no distress  HEENT: EOMI, moist oral mucus membranes  Neck: No jugular venous distention,  Lungs: Air entry B/L, no crackles or rales, no use of accessory muscles  Heart: S1, S2 heard, no rub  GI: soft, non-tender, no guarding  Extremities: mild ankle edema  Skin: warm, dry  Neurologic: no tremor, no asterixis,     Medications:  Current Outpatient Medications   Medication Sig Dispense Refill    torsemide (DEMADEX) 20 MG tablet Take 2 tablets by mouth in the morning and at bedtime 360 tablet 1    amLODIPine (NORVASC) 10 MG tablet Take 10 mg by mouth daily      aspirin 81 MG chewable tablet Take 81 mg by mouth      atorvastatin (LIPITOR) 40 MG tablet Take 40 mg by mouth daily      Cholecalciferol (VITAMIN D) 10 MCG (400 UNIT) CAPS Capsule Take 400 Units by mouth daily      clopidogrel (PLAVIX) 75 MG tablet Take 75 mg by mouth daily      febuxostat (ULORIC) 40 MG TABS tablet Take 40 mg by mouth daily      latanoprost (XALATAN) 0.005 % ophthalmic solution       MULTIPLE VITAMINS-MINERALS PO Take 1 tablet by mouth 2 times daily      nebivolol (BYSTOLIC) 20 MG TABS tablet Take 20 mg by mouth daily      tamsulosin (FLOMAX) 0.4 MG capsule Take 0.4 mg by mouth daily      iron sucrose (VENOFER) 20 MG/ML injection Infuse 200 mg intravenously once a week       No current facility-administered medications for this visit.         Laboratory & Diagnostics:  CBC:   Lab Results   Component Value Date    WBC 5.7 07/27/2022    HGB 10.5 (L) 07/27/2022    HCT 30.4 (L) 07/27/2022    MCV 85.1 07/27/2022     07/27/2022     BMP:    Lab Results   Component Value Date     (L) 07/27/2022     07/21/2022     07/21/2022    K 5.0 07/27/2022    K 4.5 07/21/2022    K 4.5 07/21/2022     07/27/2022     07/21/2022     07/21/2022    CO2 26 07/27/2022    CO2 24 07/21/2022    CO2 24 07/21/2022    BUN 85 (H) 07/27/2022    BUN 96 (H) 07/21/2022    BUN 96 07/21/2022    CREATININE 4.0 (HH) 07/27/2022    CREATININE 3.8 (H) 07/21/2022    CREATININE 3.8 07/21/2022    GLUCOSE 93 07/27/2022    GLUCOSE 95 07/21/2022    GLUCOSE 95 07/21/2022      Hepatic: No results found for: AST, ALT, ALB, BILITOT, ALKPHOS  BNP: No results found for: BNP  Lipids: No results found for: CHOL, HDL  INR: No results found for: INR  URINE:   Lab Results   Component Value Date/Time    PROTUR 140 06/03/2022 05:30 PM     No results found for: Rozena Mercury, PHUR, LABCAST, WBCUA, RBCUA, MUCUS, TRICHOMONAS, YEAST, BACTERIA, CLARITYU, SPECGRAV, LEUKOCYTESUR, UROBILINOGEN, BILIRUBINUR, BLOODU, GLUCOSEU, KETUA, AMORPHOUS   Microalbumen/Creatinine ratio:  No components found for: RUCREAT        Impression/Plan:     1. CKD V , progressive with subnephrotic proteinuria, likely nephrosclerosis. Serologies negative  -has been referred for transplant  -prefers PD when needed  -no overt uremic symptoms, continue to monitor       2. Volume overload: controlled currently on torsemide 20 mg daily. Continue daily weights, can take extra dose PRN if retaining  3. Anemia: gets Aranesp 60 mcg q 2 weeks if hgb < 10  4. Hyperkalemia better, continue low K diet  5. Vit d deficiency on replacement  6. HTN  7. AAA, KAIDEN  8. Hx prostate CA    Bloodwork and medications were reviewed and plan of care discussed with the patient. Return to clinic in 2 months or sooner if the need arises.       Kati Clark,   [facility]

## 2022-08-11 LAB
BASOPHILS # BLD: 1.3 % (ref 0–3)
EOSINOPHIL # BLD: 9.1 % (ref 0–4)
HCT VFR BLD CALC: 30.4 % (ref 42–52)
HEMOGLOBIN: 10.3 G/DL (ref 14–18)
LYMPHOCYTES # BLD: 11.7 % (ref 17.6–49.6)
MCH RBC QN AUTO: 29.1 PG (ref 28–32)
MCHC RBC AUTO-ENTMCNC: 33.9 G/DL (ref 33–37)
MCV RBC AUTO: 85.8 FL (ref 80–94)
MONOCYTES # BLD: 11.9 % (ref 4.1–12.4)
PDW BLD-RTO: 17.6 % (ref 11.5–14.5)
PLATELET # BLD: 192 THOU/CUMM (ref 130–400)
RBC: 3.55 MIL/CUMM (ref 4.7–6.1)
SCAN OF BLOOD SMEAR: NO
SEG NEUTROPHILS: 66 % (ref 39.4–72.5)
WBC: 5.7 THOU/CUMM (ref 4.8–10.8)

## 2022-08-25 LAB
BASOPHILS # BLD: 1.3 % (ref 0–3)
EOSINOPHIL # BLD: 8.5 % (ref 0–4)
HCT VFR BLD CALC: 29.3 % (ref 42–52)
HCT VFR BLD CALC: 29.3 % (ref 42–52)
HEMOGLOBIN: 9.9 G/DL (ref 14–18)
HEMOGLOBIN: 9.9 G/DL (ref 14–18)
LYMPHOCYTES # BLD: 10.2 % (ref 17.6–49.6)
MCH RBC QN AUTO: 29.4 PG (ref 28–32)
MCH RBC QN AUTO: 29.6 PG (ref 28–32)
MCHC RBC AUTO-ENTMCNC: 33.8 G/DL (ref 33–37)
MCHC RBC AUTO-ENTMCNC: 34 G/DL (ref 33–37)
MCV RBC AUTO: 87.1 FL (ref 80–94)
MCV RBC AUTO: 87.2 FL (ref 80–94)
MONOCYTES # BLD: 10.9 % (ref 4.1–12.4)
PDW BLD-RTO: 17.6 % (ref 11.5–14.5)
PDW BLD-RTO: 17.7 % (ref 11.5–14.5)
PLATELET # BLD: 190 THOU/CUMM (ref 130–400)
PLATELET # BLD: 193 THOU/CUMM (ref 130–400)
RBC: 3.36 MIL/CUMM (ref 4.7–6.1)
RBC: 3.36 MIL/CUMM (ref 4.7–6.1)
SCAN OF BLOOD SMEAR: NO
SEG NEUTROPHILS: 69.1 % (ref 39.4–72.5)
WBC: 6.6 THOU/CUMM (ref 4.8–10.8)
WBC: 6.7 THOU/CUMM (ref 4.8–10.8)

## 2022-09-08 LAB
HCT VFR BLD CALC: 30.9 % (ref 42–52)
HEMOGLOBIN: 10.7 G/DL (ref 14–18)
MCH RBC QN AUTO: 30.5 PG (ref 28–32)
MCHC RBC AUTO-ENTMCNC: 34.5 G/DL (ref 33–37)
MCV RBC AUTO: 88.2 FL (ref 80–94)
PDW BLD-RTO: 17.6 % (ref 11.5–14.5)
PLATELET # BLD: 197 THOU/CUMM (ref 130–400)
RBC: 3.5 MIL/CUMM (ref 4.7–6.1)
WBC: 6.4 THOU/CUMM (ref 4.8–10.8)

## 2022-09-22 LAB
ANISOCYTOSIS: NORMAL
BASOPHILS # BLD: 1.1 % (ref 0–3)
EOSINOPHIL # BLD: 3.8 % (ref 0–4)
HCT VFR BLD CALC: 30.6 % (ref 42–52)
HEMOGLOBIN: 10.7 G/DL (ref 14–18)
LYMPHOCYTES # BLD: 22.3 % (ref 17.6–49.6)
MCH RBC QN AUTO: 30.9 PG (ref 28–32)
MCHC RBC AUTO-ENTMCNC: 34.9 G/DL (ref 33–37)
MCV RBC AUTO: 88.6 FL (ref 80–94)
MONOCYTES # BLD: 19.2 % (ref 4.1–12.4)
MORPHOLOGY: ABNORMAL
OVALOCYTES: NORMAL
PATHOLOGIST REVIEW: NORMAL
PDW BLD-RTO: 16.5 % (ref 11.5–14.5)
PLATELET # BLD: 123 THOU/CUMM (ref 130–400)
PLATELET ESTIMATE: ABNORMAL
RBC: 3.45 MIL/CUMM (ref 4.7–6.1)
SCAN OF BLOOD SMEAR: YES
SEG NEUTROPHILS: 53.6 % (ref 39.4–72.5)
TEAR DROP CELLS: NORMAL
WBC: 2.5 THOU/CUMM (ref 4.8–10.8)

## 2022-10-06 LAB
BASOPHILS # BLD: 1.5 % (ref 0–3)
EOSINOPHIL # BLD: 4.1 % (ref 0–4)
HCT VFR BLD CALC: 28.5 % (ref 42–52)
HEMOGLOBIN: 9.9 G/DL (ref 14–18)
LYMPHOCYTES # BLD: 8.9 % (ref 17.6–49.6)
MCH RBC QN AUTO: 30.9 PG (ref 28–32)
MCHC RBC AUTO-ENTMCNC: 34.8 G/DL (ref 33–37)
MCV RBC AUTO: 88.8 FL (ref 80–94)
MONOCYTES # BLD: 11 % (ref 4.1–12.4)
PDW BLD-RTO: 15.9 % (ref 11.5–14.5)
PLATELET # BLD: 206 THOU/CUMM (ref 130–400)
RBC: 3.2 MIL/CUMM (ref 4.7–6.1)
SCAN OF BLOOD SMEAR: NO
SEG NEUTROPHILS: 74.5 % (ref 39.4–72.5)
WBC: 5.7 THOU/CUMM (ref 4.8–10.8)

## 2022-10-07 LAB — PARATHYROID HORMONE INTACT: 99.5 PG/ML (ref 15–65)

## 2022-10-12 ENCOUNTER — OFFICE VISIT (OUTPATIENT)
Dept: NEPHROLOGY | Age: 67
End: 2022-10-12
Payer: COMMERCIAL

## 2022-10-12 VITALS
HEART RATE: 70 BPM | SYSTOLIC BLOOD PRESSURE: 151 MMHG | WEIGHT: 175 LBS | DIASTOLIC BLOOD PRESSURE: 70 MMHG | BODY MASS INDEX: 24.41 KG/M2 | OXYGEN SATURATION: 98 %

## 2022-10-12 DIAGNOSIS — N18.5 ANEMIA DUE TO STAGE 5 CHRONIC KIDNEY DISEASE (HCC): ICD-10-CM

## 2022-10-12 DIAGNOSIS — D63.1 ANEMIA DUE TO STAGE 5 CHRONIC KIDNEY DISEASE (HCC): ICD-10-CM

## 2022-10-12 DIAGNOSIS — N18.5 CKD (CHRONIC KIDNEY DISEASE), STAGE V (HCC): Primary | ICD-10-CM

## 2022-10-12 PROCEDURE — 99214 OFFICE O/P EST MOD 30 MIN: CPT | Performed by: INTERNAL MEDICINE

## 2022-10-12 PROCEDURE — 1123F ACP DISCUSS/DSCN MKR DOCD: CPT | Performed by: INTERNAL MEDICINE

## 2022-10-12 RX ORDER — HYDRALAZINE HYDROCHLORIDE 25 MG/1
25 TABLET, FILM COATED ORAL 2 TIMES DAILY
Qty: 180 TABLET | Refills: 1 | Status: SHIPPED | OUTPATIENT
Start: 2022-10-12 | End: 2023-01-10

## 2022-10-12 NOTE — PROGRESS NOTES
[address] [facility] AND [facility]ERT[facility]  [address] [facility]  [address]  [facility] [address]  Dept: [phone]  Loc: [phone]  Progress Note  10/12/2022 11:19 AM      Pt Name:    Tony Adamson  YOB: 1955  Primary Care Physician:  Angel Smyth MD     Chief Complaint:   Chief Complaint   Patient presents with    Follow-up     CKD V         History of Present Illness: This is a follow-up visit for CKD IV/V. He has a history of HFmrEF (40%), anemia, prostate CA s/p RT November 2021, carotid artery stenosis s/p endarterectomy 2012, and AAA s/p EVAR in 2018. He had previous duplex renal artery US which showed no renal artery stenosis. Kidneys normal size with multiple renal cysts B/L. Accompanied by his wife. He is being worked up for transplant at [facility]. His wife is going to be worked up as a donor. 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 Takes an extra torsemide on PRN basis which helps. Currently takes 20 mg daily. Just got aranesp shot last week. Otherwise has good appetite and feels well. Bp running higher,he isn't checking at home. Pertinent items are noted in HPI. Past History:  No past medical history on file. No past surgical history on file. VITALS:  BP (!) 151/70 (Site: Right Upper Arm, Position: Sitting, Cuff Size: Large Adult)   Pulse 70   Wt 175 lb (79.4 kg)   SpO2 98%   BMI 24.41 kg/m²   Wt Readings from Last 3 Encounters:   10/12/22 175 lb (79.4 kg)   08/02/22 171 lb (77.6 kg)   06/06/22 172 lb (78 kg)     Body mass index is 24.41 kg/m².      General Appearance: alert and cooperative with exam, appears comfortable, no distress  HEENT: EOMI, moist oral mucus membranes  Neck: No jugular venous distention,  Lungs: Air entry B/L, no crackles or rales, no use of accessory muscles  Heart: S1, S2 heard, no rub  GI: soft, non-tender, no guarding  Extremities: trace ankle edema  Skin: warm, dry  Neurologic: no tremor, no asterixis,     Medications:  Current Outpatient Medications   Medication Sig Dispense Refill    torsemide (DEMADEX) 20 MG tablet Take 2 tablets by mouth in the morning and at bedtime (Patient taking differently: Take 20 mg by mouth in the morning and at bedtime) 360 tablet 1    amLODIPine (NORVASC) 10 MG tablet Take 10 mg by mouth daily      aspirin 81 MG chewable tablet Take 81 mg by mouth      atorvastatin (LIPITOR) 40 MG tablet Take 40 mg by mouth daily      Cholecalciferol (VITAMIN D) 10 MCG (400 UNIT) CAPS Capsule Take 400 Units by mouth daily      clopidogrel (PLAVIX) 75 MG tablet Take 75 mg by mouth daily      febuxostat (ULORIC) 40 MG TABS tablet Take 40 mg by mouth daily      latanoprost (XALATAN) 0.005 % ophthalmic solution       MULTIPLE VITAMINS-MINERALS PO Take 1 tablet by mouth 2 times daily      nebivolol (BYSTOLIC) 20 MG TABS tablet Take 20 mg by mouth daily      tamsulosin (FLOMAX) 0.4 MG capsule Take 0.4 mg by mouth daily      iron sucrose (VENOFER) 20 MG/ML injection Infuse 200 mg intravenously once a week       No current facility-administered medications for this visit.         Laboratory & Diagnostics:  CBC:   Lab Results   Component Value Date    WBC 5.7 10/06/2022    HGB 9.9 (L) 10/06/2022    HCT 28.5 (L) 10/06/2022    MCV 88.8 10/06/2022     10/06/2022     BMP:    Lab Results   Component Value Date     (L) 07/27/2022     07/21/2022     07/21/2022    K 5.0 07/27/2022    K 4.5 07/21/2022    K 4.5 07/21/2022     07/27/2022     07/21/2022     07/21/2022    CO2 26 07/27/2022    CO2 24 07/21/2022    CO2 24 07/21/2022    BUN 85 (H) 07/27/2022    BUN 96 (H) 07/21/2022    BUN 96 07/21/2022    CREATININE 4.0 (HH) 07/27/2022    CREATININE 3.8 (H) 07/21/2022    CREATININE 3.8 07/21/2022    GLUCOSE 93 07/27/2022    GLUCOSE 95 07/21/2022    GLUCOSE 95 07/21/2022      Hepatic: No results found for: AST, ALT, ALB, BILITOT, ALKPHOS  BNP: No results found for: BNP  Lipids: No results found for: CHOL, HDL  INR: No results found for: INR  URINE:   Lab Results   Component Value Date/Time    PROTUR 140 06/03/2022 05:30 PM     No results found for: NITRU, COLORU, PHUR, LABCAST, WBCUA, RBCUA, MUCUS, TRICHOMONAS, YEAST, BACTERIA, CLARITYU, SPECGRAV, LEUKOCYTESUR, UROBILINOGEN, BILIRUBINUR, BLOODU, GLUCOSEU, KETUA, AMORPHOUS   Microalbumen/Creatinine ratio:  No components found for: RUCREAT        Impression/Plan:     1. CKD IV/V , progressive with subnephrotic proteinuria, likely due to hypertensive nephrosclerosis. Serologies negative  -creat stable at 3.7, no overt uremic symptoms.   -continue transplant evaluation  -prefers PD when needed         2. Volume overload: continue torsemide 20 mg daily, can take extra dose PRN. Advised compression stockings  3. Anemia: gets Aranesp 60 mcg q 2 weeks if hgb < 10  4. Hyperkalemia better, continue low K diet  5. Vit d deficiency on replacement  6. HTN : add hydralazine  7. AAA, KAIDEN  8. Hx prostate CA    Bloodwork and medications were reviewed and plan of care discussed with the patient. Return to clinic in 3 months or sooner if the need arises.       Kimberley Dunham,   [facility]

## 2022-10-20 LAB
HCT VFR BLD CALC: 29.6 % (ref 42–52)
HEMOGLOBIN: 10.1 G/DL (ref 14–18)

## 2022-11-10 LAB
HCT VFR BLD CALC: 29.6 % (ref 41–53)
HEMOGLOBIN: 10.1 G/DL (ref 13.5–17.5)

## 2022-11-30 ENCOUNTER — TELEPHONE (OUTPATIENT)
Dept: NEPHROLOGY | Age: 67
End: 2022-11-30

## 2022-11-30 RX ORDER — METOLAZONE 5 MG/1
5 TABLET ORAL DAILY
Qty: 3 TABLET | Refills: 1 | Status: SHIPPED | OUTPATIENT
Start: 2022-11-30 | End: 2022-12-03

## 2022-11-30 NOTE — TELEPHONE ENCOUNTER
Patients wife called stating that he has been out in the fields and with this he is having increased swelling bilateral. He is also having some shortness of breath. Harjit Khanna stated that when this happens he needs a new Diuretic for a couple days only and it takes care of it. Could not tell me the name. Patient is also going in for standing order labs to [place] [id number] any additional labs are needed.

## 2022-12-01 LAB
BASOPHILS # BLD: 1.3 % (ref 0–3)
BASOPHILS ABSOLUTE: ABNORMAL
BASOPHILS RELATIVE PERCENT: ABNORMAL
EOSINOPHIL # BLD: 8.7 % (ref 0–4)
EOSINOPHILS ABSOLUTE: ABNORMAL
EOSINOPHILS RELATIVE PERCENT: ABNORMAL
HCT VFR BLD CALC: 27.3 % (ref 41–53)
HCT VFR BLD CALC: 27.3 % (ref 42–52)
HEMOGLOBIN: 9.3 G/DL (ref 13.5–17.5)
HEMOGLOBIN: 9.3 G/DL (ref 14–18)
LYMPHOCYTES # BLD: 10 % (ref 17.6–49.6)
LYMPHOCYTES ABSOLUTE: ABNORMAL
LYMPHOCYTES RELATIVE PERCENT: ABNORMAL
MCH RBC QN AUTO: 30.1 PG
MCH RBC QN AUTO: 30.1 PG (ref 28–32)
MCHC RBC AUTO-ENTMCNC: 34.2 G/DL
MCHC RBC AUTO-ENTMCNC: 34.2 G/DL (ref 33–37)
MCV RBC AUTO: 88.2 FL
MCV RBC AUTO: 88.2 FL (ref 80–94)
MONOCYTES # BLD: 11.9 % (ref 4.1–12.4)
MONOCYTES ABSOLUTE: ABNORMAL
MONOCYTES RELATIVE PERCENT: ABNORMAL
NEUTROPHILS ABSOLUTE: ABNORMAL
NEUTROPHILS RELATIVE PERCENT: ABNORMAL
PDW BLD-RTO: 14.4 % (ref 11.5–14.5)
PLATELET # BLD: 242 K/ΜL
PLATELET # BLD: 242 THOU/CUMM (ref 130–400)
PMV BLD AUTO: ABNORMAL FL
RBC # BLD: 3.09 10^6/ΜL
RBC: 3.09 MIL/CUMM (ref 4.7–6.1)
SCAN OF BLOOD SMEAR: NO
SEG NEUTROPHILS: 68.1 % (ref 39.4–72.5)
WBC # BLD: 6.7 10^3/ML
WBC: 6.7 THOU/CUMM (ref 4.8–10.8)

## 2023-01-03 DIAGNOSIS — N39.0 URINARY TRACT INFECTION WITHOUT HEMATURIA, SITE UNSPECIFIED: Primary | ICD-10-CM

## 2023-01-03 NOTE — TELEPHONE ENCOUNTER
Pt phoned - has had blood in urine with burning since Sunday jan 1 - should he do anything before his apt on 1-10-23? (Dr Sylvie Mcmanus pt)

## 2023-01-03 NOTE — TELEPHONE ENCOUNTER
Start him on amoxicillin 500 mg twice a day for 10 days if he has no penicillin allergy  Urinalysis with culture before starting the antibiotic

## 2023-01-04 RX ORDER — AMOXICILLIN 500 MG/1
500 CAPSULE ORAL 2 TIMES DAILY
Qty: 20 CAPSULE | Refills: 0 | Status: SHIPPED | OUTPATIENT
Start: 2023-01-04 | End: 2023-01-14

## 2023-01-09 PROBLEM — I12.9 HYPERTENSIVE KIDNEY DISEASE WITH CHRONIC KIDNEY DISEASE: Status: ACTIVE | Noted: 2018-06-07

## 2023-01-09 PROBLEM — D63.1 ANEMIA OF CHRONIC RENAL FAILURE: Status: ACTIVE | Noted: 2022-05-12

## 2023-01-09 PROBLEM — N18.9 ANEMIA OF CHRONIC RENAL FAILURE: Status: ACTIVE | Noted: 2022-05-12

## 2023-01-09 PROBLEM — I12.0: Status: ACTIVE | Noted: 2022-05-12

## 2023-01-10 ENCOUNTER — OFFICE VISIT (OUTPATIENT)
Dept: NEPHROLOGY | Age: 68
End: 2023-01-10
Payer: COMMERCIAL

## 2023-01-10 VITALS
DIASTOLIC BLOOD PRESSURE: 88 MMHG | BODY MASS INDEX: 23.66 KG/M2 | HEART RATE: 57 BPM | OXYGEN SATURATION: 96 % | SYSTOLIC BLOOD PRESSURE: 138 MMHG | HEIGHT: 71 IN | WEIGHT: 169 LBS

## 2023-01-10 DIAGNOSIS — E87.20 METABOLIC ACIDOSIS: ICD-10-CM

## 2023-01-10 DIAGNOSIS — N25.81 HYPERPARATHYROIDISM, SECONDARY RENAL (HCC): ICD-10-CM

## 2023-01-10 DIAGNOSIS — E55.9 VITAMIN D DEFICIENCY: ICD-10-CM

## 2023-01-10 DIAGNOSIS — D63.8 ANEMIA OF CHRONIC DISEASE: ICD-10-CM

## 2023-01-10 DIAGNOSIS — E87.5 HYPERKALEMIA: ICD-10-CM

## 2023-01-10 DIAGNOSIS — N18.5 CKD (CHRONIC KIDNEY DISEASE) STAGE 5, GFR LESS THAN 15 ML/MIN (HCC): Primary | ICD-10-CM

## 2023-01-10 DIAGNOSIS — I10 PRIMARY HYPERTENSION: ICD-10-CM

## 2023-01-10 PROCEDURE — 99214 OFFICE O/P EST MOD 30 MIN: CPT | Performed by: INTERNAL MEDICINE

## 2023-01-10 PROCEDURE — 1123F ACP DISCUSS/DSCN MKR DOCD: CPT | Performed by: INTERNAL MEDICINE

## 2023-01-10 PROCEDURE — 3079F DIAST BP 80-89 MM HG: CPT | Performed by: INTERNAL MEDICINE

## 2023-01-10 PROCEDURE — 3075F SYST BP GE 130 - 139MM HG: CPT | Performed by: INTERNAL MEDICINE

## 2023-01-10 NOTE — PATIENT INSTRUCTIONS
Drugs to Avoid with Chronic Kidney Disease    Non-steroidal anti-inflammatory drugs (NSAIDS)    Potential complication and side effects of NSAIDS include:  Kidney injury and worsening kidney function   Risk of stomach ulcer and intestinal bleeding  Fluid retention and edema  Increased Blood Pressure    Non-Steroidal Anti-Inflammatory Drugs    Celecoxib (Celebrex)  Choline and magnesium salicylates (CMT, Trisosal, Trilisate)  Choline salicylate (Arthropan)  Diclofenac (Voltaren, Arthrotec, Cataflam, Cambia, Voltaren-XR, Zipsor)  Diflunisal (Dolobid)  Etodolac (Lodine XL, Lodine)  Famotidine + Ibuprofen (Duexis)  Fenoprofen (Nalfon, Nalfon 200)  Furbiprofen (Asaid)  Ibuprofen (Advil, Motrin, Midol, Nuprin, Genpril, Dolgesic,Profen,, Vicoprofen,Combunox, Actiprofen, Addaprin, Caldolor, Haltran, Q-Profen,Ibren, Menadol, Rufen,Saleto-200, [place],Ultraprin, Uni-Pro,Wal-Profen)  Indomethacin (Indocin, Indomethegan, Indo-Delta)   Ketoprofen (Orudis  KT, Oruvail, Actron)  Ketorolac (Toradol, Sprix)  Magnesium Sulfate (Arthritab, Mayra Select, Sergio's pills, Ezio, Mobidin, Mobogesic)  Meclofenamate Sodium (Ponstel)  Meloxicam (Mobic)  Naproxen (Aleve, Anaprox, Naprosyn, EC-Naprosyn, Naprelan, All Day Pain Relief, Aflaxen, Anaprox-DS, Midol Extended Relief, Naprelan,Prevacid NapraPac, Naprapac, Vimovo)  Nabumetone (Relafen)  Oxaprozin (Daypro)  Piroxicam (Feldene)  Rofecoxib (Vioxx)  Salsalate (Amigesic, Anaflex 750, Disalcid, Marthritic, Mono-gesic, Salflex, Salsitab)  Sodium salicylate (various generics)  Sulindac (Clinoril)  Tolmetin (Tolectin)  Valdecoxib (Bextra)  Mefenamic Acid (Ponstel)  Famotidine and Ibuprofen (Duexis) but not famotidine by itself  Meclofenamate (Meclomen)    Antibiotics  Bactrim  Gentamycin  Tobramycin  Vancomycin  Tetracycline  Macrobid    Other                  IV contrast dye  Low Potassium Diet:       Foods are low in potassium. Plan to eat these every day.  But don't eat more than 4 servings a day. A serving equals 1 small piece of fruit or ½ cup. Fresh fruit: Apples, applesauce, blueberries, grapes, pineapple, plums, watermelon   Canned fruit: Peaches and pears. Vegetables: Green or wax beans,broccoli, cabbage, carrots, corn, lettuce, radishes, green peas spinach. Cheese, Pasta, rice, bread    Foods are high in potassium. Don't eat more than 1 serving of these a day. A serving equals 1 small piece of fruit or ½ cup. Fresh fruit: Blackberries, boysenberries, cherries, grapefruit, strawberries  Vegetables: Asparagus, carrots, beets, corn, turnips, canned tomatoes, white mushrooms, and zucchini. Milk and yogurt. Don't eat more than 1 cup a day. Foods are very high in potassium. Avoid these foods. Fruits: Apricots, bananas, dates, figs, honeydew, cantaloupe, raisins, prunes, kiwi fruit, nectarines, oranges, and orange juice, watermelon  Vegetables: Avocados, artichokes, brussels sprouts, potatoes, leafy  green vegetables (such as spinach), winter squash, fresh tomatoes, tomato paste, yams, and dried peas, beans, and lentils. Clams, chocolate, nuts, seeds, molasses, and sardines. Lower potassium in potatoes by \"leaching\". Boil the potatoes in water and then drain the liquid off. Repeat the rinse twice. .    Do not use    salt substitute or \"lite\" salt,    Mariano Lite Salt   No Salt, Nu Salt. These often are very high in potassium.      Mrs Michelle Yee is ok to use since it does not contain potassium      Potassium Content of Foods   (listing by high to low content)    Food  Serving Size Potassium (mg)   Baked potato (flesh only) one medium 610   Sweet potato, baked one medium 542   Banana, raw  one medium 422   Spinach, cooked ½ C 420   Cazares beans, cooked ½ C 373   Kidney beans, cooked ½ C 371   Lentils, cooked ½ C 366   Navy beans, cooked ½ C 354   Plums, dried, pitted five 350   Artichokes, cooked one medium 343   Mashed potatoes ½ C 343   Edamame/soybeans, green ½ C 338   Tomato, canned  ½ C 325   Raisins ¼ C 299   Tomato, raw one medium 292   Papaya one small 286   Peach one medium 285   Pistachios, dry roasted, salted  1 oz (47 nuts) 285   Pumpkin, cooked, mashed ½ C 282   French fries 10 fries 278   Mushrooms, white, cooked ½ C 278   Beets, cooked ½ C 259   [place] sprouts, cooked ½ C 247   Kiwi one medium 237   Orange, raw one medium 237   Green peas, cooked ½ C 217   Cantaloupe, raw ½ C 214   Pear, raw one medium 212   Almonds, dry roasted 1 oz (24 nuts) 202   Apricot, canned, juice pack ½ C 202   Asparagus, cooked ½ C 202   [place] squash, cooked ½ C 201   Apple, raw with skin one medium 195   Honeydew ½ C 194   Carrots, cooked ½ C 183   Onions, cooked ½ C 175   Spinach, raw 1 C 167   Corn, sweet yellow ½ C 163   Red yusuf pepper ½ C 157   Kale, cooked ½ C 150   Cabbage, cooked ½ C 147   Mustard greens, cooked ½ C 142   Garber ½ C 139   Figs, dried two figs 134   Summer squash, cooked ½ C 126   Grapes 10 grapes 120   Okra, cooked ½ C 108   Bamboo shoots, canned 1 C 108   Celery, raw one stalk 105   Peanut butter, creamy 1 Tbsp 104   Green beans, cooked ½ C 104   Cauliflower, cooked ½ C 91   Mushrooms, shitake, cooked ½ C 88   Watermelon ½ C 85   Cucumber, peeled ½ C 88   Iceberg lettuce 1 C 81   Tomato, sun dried one piece 80   Eggplant, cooked ½ C 69   Pickle one pickle 61   Ketchup 1 Tbsp 60   Radishes one radish 57     5   C=cup, mg=milligrams, oz=ounces, Tbsp=tablespoon    Reference  [place] Dept of Agriculture, Agricultural Research Service, [employer]  [employer] Database for Standard Reference, Release 25: Potassium, K (mg) content of selected foods per common measure

## 2023-01-10 NOTE — PROGRESS NOTES
Renal Progress Note    Assessment and Plan:      Diagnosis Orders   1. CKD (chronic kidney disease) stage 5, GFR less than 15 ml/min ([facility])        2. Primary hypertension        3. Metabolic acidosis        4. Hyperkalemia        5. Anemia of chronic disease                  PLAN:  I discussed my thoughts at length with patient and spouse. He understood well. I advised the questions. Lab result reviewed with them together in epic. Serum creatinine is increased to 4.4 mg/dL from 4.3 mg percent in July 2020. Serum potassium level is borderline high at 5.2 mEq/L. Serum bicarbonate is borderline low at 21 mcg/L. Serum phosphorus is slightly high at 5.4 mEq/L. Vitamin D level is 30  PTH is 60.9. These were all discussed with the patient and spouse. Low potassium diet. Repeat potassium level next week. No treatment for the slightly high phosphorus level for now. No treatment for the low serum bicarbonate level since it is mild. Return visit in 3 months with labs  220 serum phosphorus level assess and very slightly high for now.           Patient Active Problem List   Diagnosis    AAA (abdominal aortic aneurysm) without rupture    Acute post-operative pain    Age-related nuclear cataract of both eyes    Atelectasis    Bilateral carotid artery stenosis    BPH with obstruction/lower urinary tract symptoms    Centrilobular emphysema (HCC)    Current smoker    CARDOSO (dyspnea on exertion)    Edema, lower extremity    Elevated prostate specific antigen (PSA)    Essential hypertension    Exudative age-related macular degeneration of left eye (HCC)    Anemia of renal disease    Stage 4 chronic kidney disease (HCC)    Hypertensive chronic kidney disease with stage 1 through stage 4 chronic kidney disease, or unspecified chronic kidney disease    Hyperuricemia    IFG (impaired fasting glucose)    Anemia, iron deficiency    Iron deficiency anemia secondary to inadequate dietary iron intake    Macular degeneration Malignant neoplasm of prostate ([facility] [address]    Hyperlipidemia    Mixed hyperlipidemia    Cerebellar stroke ([facility] [facility]    CVA (cerebral vascular accident) ([facility] [address] [facility]    PAD (peripheral artery disease) ([facility] [address] [facility]    Pleural effusion    Primary open angle glaucoma (POAG) of both eyes, mild stage    Sleep pattern disturbance    Anemia of chronic renal failure    Hypertensive kidney disease with chronic kidney disease    Hypertensive kidney disease with CKD (chronic kidney disease), stage 5 chronic kidney disease or end stage renal disease ([facility] [address]           Subjective:   Chief complaint:  Chief Complaint   Patient presents with    Chronic Kidney Disease     v      HPI:This is a follow up visit for Mr. Jose Olivarez who is here today for return appointment. We see him for chronic kidney disease among other things. He was last seen by Dr. Andrez Marrero October 2022. .  Doing well so far. He had a recent blood in the urine. UTI was considered. Urine culture still pending. No chest pain or shortness of breath. ROS:  Pertinent positives stated above in HPI. All other systems were reviewed and were negative. Medications:     Current Outpatient Medications   Medication Sig Dispense Refill    amoxicillin (AMOXIL) 500 MG capsule Take 1 capsule by mouth 2 times daily for 10 days 20 capsule 0    hydrALAZINE (APRESOLINE) 25 MG tablet Take 1 tablet by mouth in the morning and at bedtime 180 tablet 1    torsemide (DEMADEX) 20 MG tablet Take 2 tablets by mouth in the morning and at bedtime (Patient taking differently: Take 20 mg by mouth in the morning and at bedtime Pt.  Adjusts as he feels is needed) 360 tablet 1    amLODIPine (NORVASC) 10 MG tablet Take 10 mg by mouth daily      aspirin 81 MG chewable tablet Take 81 mg by mouth      atorvastatin (LIPITOR) 40 MG tablet Take 40 mg by mouth daily      Cholecalciferol (VITAMIN D) 10 MCG (400 UNIT) CAPS Capsule Take 400 Units by mouth daily      clopidogrel (PLAVIX) 75 MG tablet Take 75 mg by mouth daily febuxostat (ULORIC) 40 MG TABS tablet Take 40 mg by mouth daily      latanoprost (XALATAN) 0.005 % ophthalmic solution       MULTIPLE VITAMINS-MINERALS PO Take 1 tablet by mouth 2 times daily preservision      nebivolol (BYSTOLIC) 20 MG TABS tablet Take 20 mg by mouth daily      tamsulosin (FLOMAX) 0.4 MG capsule Take 0.4 mg by mouth daily      iron sucrose (VENOFER) 20 MG/ML injection Infuse 200 mg intravenously once a week      metOLazone (ZAROXOLYN) 5 MG tablet Take 1 tablet by mouth daily for 3 days (Patient not taking: Reported on 1/10/2023) 3 tablet 1     No current facility-administered medications for this visit.        Lab Results:    CBC:   Lab Results   Component Value Date    WBC 5.8 01/06/2023    HGB 9.6 (L) 01/06/2023    HCT 29.3 (L) 01/06/2023    MCV 89.3 01/06/2023     01/06/2023     BMP:    Lab Results   Component Value Date     01/06/2023     (L) 07/27/2022     07/21/2022    K 5.2 (H) 01/06/2023    K 5.0 07/27/2022    K 4.5 07/21/2022     (H) 01/06/2023     07/27/2022     07/21/2022    CO2 21 (L) 01/06/2023    CO2 26 07/27/2022    CO2 24 07/21/2022    BUN 86 (H) 01/06/2023    BUN 85 (H) 07/27/2022    BUN 96 (H) 07/21/2022    CREATININE 4.4 (HH) 01/06/2023    CREATININE 4.0 (HH) 07/27/2022    CREATININE 3.8 (H) 07/21/2022    GLUCOSE 101 01/06/2023    GLUCOSE 93 07/27/2022    GLUCOSE 95 07/21/2022      Hepatic: No results found for: AST, ALT, ALB, BILITOT, ALKPHOS  BNP: No results found for: BNP  Lipids: No results found for: CHOL, HDL  INR: No results found for: INR  URINE:   Lab Results   Component Value Date/Time    PROTUR >=300 01/03/2023 03:15 PM     Lab Results   Component Value Date/Time    NITRU NEGATIVE 01/03/2023 03:15 PM    COLORU RED 01/03/2023 03:15 PM    LABCAST NONE SEEN 01/03/2023 03:15 PM    WBCUA 0-2 01/03/2023 03:15 PM    RBCUA  01/03/2023 03:15 PM    MUCUS NONE SEEN 01/03/2023 03:15 PM    BACTERIA NONE SEEN 01/03/2023 03:15 PM CLARITYU CLEAR 01/03/2023 03:15 PM    SPECGRAV 1.010 01/03/2023 03:15 PM    LEUKOCYTESUR TRACE 01/03/2023 03:15 PM    UROBILINOGEN 0.2 01/03/2023 03:15 PM    BILIRUBINUR NEGATIVE 01/03/2023 03:15 PM    BLOODU LARGE 01/03/2023 03:15 PM    GLUCOSEU NEGATIVE 01/03/2023 03:15 PM    KETUA NEGATIVE 01/03/2023 03:15 PM      Microalbumen/Creatinine ratio:  No components found for: RUCREAT    Objective:   Vitals: /88 (Site: Right Upper Arm, Position: Sitting, Cuff Size: Small Adult)   Pulse 57   Ht 5' 11\" (1.803 m)   Wt 169 lb (76.7 kg)   SpO2 96%   BMI 23.57 kg/m²      Constitutional:  Alert, awake, no apparent distress  Skin:normal with no rash or any significant lesions  HEENT:Pupils are reactive . Throat is clear. Oral mucosa is moist.  Neck:supple with no thyromegaly, JVD, lymphadenopathy or bruit   Cardiovascular: Regular sinus rhythm without murmur, rubs or gallops   Respiratory:  Clear to auscultation with no wheezes or rales  Abdomen: Good bowel sound, soft, non tender and no bruit  Ext: No LE edema  Musculoskeletal:Intact  Neuro:Alert, awake and oriented with no obvious focal deficit. Speech is normal.**    Electronically signed by Aga Demarco MD on 1/10/2023 at 9:09 AM   **This report has been created using voice recognition software. It maycontain minor  errors which are inherent in voice recognition technology. **

## 2023-01-11 LAB
BASOPHILS # BLD: 1.5 % (ref 0–3)
EOSINOPHIL # BLD: 9.8 % (ref 0–4)
HCT VFR BLD CALC: 31.7 % (ref 42–52)
HEMOGLOBIN: 10.6 G/DL (ref 14–18)
LYMPHOCYTES # BLD: 8 % (ref 17.6–49.6)
MCH RBC QN AUTO: 29.2 PG (ref 28–32)
MCHC RBC AUTO-ENTMCNC: 33.5 G/DL (ref 33–37)
MCV RBC AUTO: 87.2 FL (ref 80–94)
MONOCYTES # BLD: 13.2 % (ref 4.1–12.4)
PDW BLD-RTO: 16 % (ref 11.5–14.5)
PLATELET # BLD: 234 THOU/CUMM (ref 130–400)
RBC: 3.63 MIL/CUMM (ref 4.7–6.1)
SCAN OF BLOOD SMEAR: NO
SEG NEUTROPHILS: 67.5 % (ref 39.4–72.5)
WBC: 6.5 THOU/CUMM (ref 4.8–10.8)

## 2023-01-26 LAB
BASOPHILS # BLD: 2 % (ref 0–3)
EOSINOPHIL # BLD: 8.1 % (ref 0–4)
HCT VFR BLD CALC: 28.8 % (ref 42–52)
HEMOGLOBIN: 9.6 G/DL (ref 14–18)
LYMPHOCYTES # BLD: 8 % (ref 17.6–49.6)
MCH RBC QN AUTO: 28.8 PG (ref 28–32)
MCHC RBC AUTO-ENTMCNC: 33.3 G/DL (ref 33–37)
MCV RBC AUTO: 86.6 FL (ref 80–94)
MONOCYTES # BLD: 10.4 % (ref 4.1–12.4)
PDW BLD-RTO: 15.7 % (ref 11.5–14.5)
PLATELET # BLD: 197 THOU/CUMM (ref 130–400)
RBC: 3.32 MIL/CUMM (ref 4.7–6.1)
SCAN OF BLOOD SMEAR: NO
SEG NEUTROPHILS: 71.5 % (ref 39.4–72.5)
WBC: 6.9 THOU/CUMM (ref 4.8–10.8)

## 2023-02-09 LAB
BASOPHILS # BLD: 1.3 % (ref 0–3)
EOSINOPHIL # BLD: 7.8 % (ref 0–4)
HCT VFR BLD CALC: 28 % (ref 42–52)
HEMOGLOBIN: 9.3 G/DL (ref 14–18)
LYMPHOCYTES # BLD: 6.2 % (ref 17.6–49.6)
MCH RBC QN AUTO: 28.4 PG (ref 28–32)
MCHC RBC AUTO-ENTMCNC: 33.2 G/DL (ref 33–37)
MCV RBC AUTO: 85.4 FL (ref 80–94)
MONOCYTES # BLD: 9.2 % (ref 4.1–12.4)
PDW BLD-RTO: 16.3 % (ref 11.5–14.5)
PLATELET # BLD: 219 THOU/CUMM (ref 130–400)
RBC: 3.28 MIL/CUMM (ref 4.7–6.1)
SCAN OF BLOOD SMEAR: NO
SEG NEUTROPHILS: 75.5 % (ref 39.4–72.5)
WBC: 5.7 THOU/CUMM (ref 4.8–10.8)

## 2023-02-20 ENCOUNTER — TELEPHONE (OUTPATIENT)
Dept: NEPHROLOGY | Age: 68
End: 2023-02-20

## 2023-02-20 NOTE — TELEPHONE ENCOUNTER
Pt's wife called to inform us that pt has gained 2+ pounds and it fluctuates. She also stated that pt has had frequent urination and his PCP put pt on Cipro for prostatitis. She did not know if his kidneys are causing any of these issues.  Last BMP on 2/9/23

## 2023-02-23 ENCOUNTER — OFFICE VISIT (OUTPATIENT)
Dept: NEPHROLOGY | Age: 68
End: 2023-02-23
Payer: COMMERCIAL

## 2023-02-23 ENCOUNTER — TELEPHONE (OUTPATIENT)
Dept: NEPHROLOGY | Age: 68
End: 2023-02-23

## 2023-02-23 VITALS
HEIGHT: 71 IN | OXYGEN SATURATION: 96 % | HEART RATE: 55 BPM | SYSTOLIC BLOOD PRESSURE: 138 MMHG | DIASTOLIC BLOOD PRESSURE: 67 MMHG | WEIGHT: 176 LBS | BODY MASS INDEX: 24.64 KG/M2

## 2023-02-23 DIAGNOSIS — R39.9 UTI SYMPTOMS: ICD-10-CM

## 2023-02-23 DIAGNOSIS — E87.1 HYPONATREMIA: ICD-10-CM

## 2023-02-23 DIAGNOSIS — D63.8 ANEMIA OF CHRONIC DISEASE: ICD-10-CM

## 2023-02-23 DIAGNOSIS — E78.5 DYSLIPIDEMIA: ICD-10-CM

## 2023-02-23 DIAGNOSIS — N28.1 BILATERAL RENAL CYSTS: ICD-10-CM

## 2023-02-23 DIAGNOSIS — N39.0 RECURRENT UTI: ICD-10-CM

## 2023-02-23 DIAGNOSIS — I10 PRIMARY HYPERTENSION: ICD-10-CM

## 2023-02-23 DIAGNOSIS — E87.20 METABOLIC ACIDOSIS: ICD-10-CM

## 2023-02-23 DIAGNOSIS — N18.5 CKD (CHRONIC KIDNEY DISEASE) STAGE 5, GFR LESS THAN 15 ML/MIN (HCC): Primary | ICD-10-CM

## 2023-02-23 DIAGNOSIS — I10 ESSENTIAL HYPERTENSION: Primary | ICD-10-CM

## 2023-02-23 LAB
BASOPHILS # BLD: 1.4 % (ref 0–3)
BASOPHILS ABSOLUTE: ABNORMAL
BASOPHILS RELATIVE PERCENT: 1.4 %
EOSINOPHIL # BLD: 8.1 % (ref 0–4)
EOSINOPHILS ABSOLUTE: ABNORMAL
EOSINOPHILS RELATIVE PERCENT: 8.1 %
HCT VFR BLD CALC: 27.8 % (ref 41–53)
HCT VFR BLD CALC: 27.8 % (ref 42–52)
HEMOGLOBIN: 9.3 G/DL (ref 13.5–17.5)
HEMOGLOBIN: 9.3 G/DL (ref 14–18)
LYMPHOCYTES # BLD: 7 % (ref 17.6–49.6)
LYMPHOCYTES ABSOLUTE: ABNORMAL
LYMPHOCYTES RELATIVE PERCENT: 7 %
MCH RBC QN AUTO: 28.6 PG
MCH RBC QN AUTO: 28.6 PG (ref 28–32)
MCHC RBC AUTO-ENTMCNC: 33.6 G/DL
MCHC RBC AUTO-ENTMCNC: 33.6 G/DL (ref 33–37)
MCV RBC AUTO: 85.2 FL
MCV RBC AUTO: 85.2 FL (ref 80–94)
MONOCYTES # BLD: 11.5 % (ref 4.1–12.4)
MONOCYTES ABSOLUTE: ABNORMAL
MONOCYTES RELATIVE PERCENT: 11.5 %
NEUTROPHILS ABSOLUTE: ABNORMAL
NEUTROPHILS RELATIVE PERCENT: ABNORMAL
PDW BLD-RTO: 16.4 % (ref 11.5–14.5)
PLATELET # BLD: 191 K/ΜL
PLATELET # BLD: 191 THOU/CUMM (ref 130–400)
PMV BLD AUTO: ABNORMAL FL
RBC # BLD: 3.26 10^6/ΜL
RBC: 3.26 MIL/CUMM (ref 4.7–6.1)
SCAN OF BLOOD SMEAR: NO
SEG NEUTROPHILS: 72 % (ref 39.4–72.5)
WBC # BLD: 5.2 10^3/ML
WBC: 5.2 THOU/CUMM (ref 4.8–10.8)

## 2023-02-23 PROCEDURE — 1123F ACP DISCUSS/DSCN MKR DOCD: CPT | Performed by: INTERNAL MEDICINE

## 2023-02-23 PROCEDURE — 3075F SYST BP GE 130 - 139MM HG: CPT | Performed by: INTERNAL MEDICINE

## 2023-02-23 PROCEDURE — 3078F DIAST BP <80 MM HG: CPT | Performed by: INTERNAL MEDICINE

## 2023-02-23 PROCEDURE — 99214 OFFICE O/P EST MOD 30 MIN: CPT | Performed by: INTERNAL MEDICINE

## 2023-02-23 NOTE — TELEPHONE ENCOUNTER
Wife called today. She said that Sunil Barnes is still having UTI symptoms. He is still on an antibiotic. Wife wants to know if he can take metolazone with the torsemide for a couple days. Swelling in feet that does not go down. Wife requested an appointment.  I put him on for today at 3:40 pm.

## 2023-02-23 NOTE — PROGRESS NOTES
Currently on Cipro for a UTI he has been on it for about a month he has one more day and he is still having UTI symptoms.

## 2023-02-23 NOTE — PROGRESS NOTES
Renal Progress Note    Assessment and Plan:      Diagnosis Orders   1. CKD (chronic kidney disease) stage 5, GFR less than 15 ml/min ([facility])  Basic Metabolic Panel      2. Bilateral renal cysts        3. Metabolic acidosis        4. Hyponatremia        5. Recurrent UTI  Urinalysis with Reflex to Culture      6. Dyslipidemia        7. Primary hypertension        8.  Anemia of chronic disease                  PLAN:  I spent some time discussing my thoughts with the patient and spouse  They understood well  I addressed their several questions  Lab result reviewed with them together in epic  Hemoglobin is stable at 9.3 g  Serum creatinine slightly increased to 4.4 mg/dL from 4.1 mg/dL on 18 January 2023 but it was also 4.4 mg/dL on 6 January 2023  Medications reviewed  Continue current antibiotic until it is finished  May consider chronic antibiotic suppressive therapy if he continues to have recurrent DVT  May also need urology evaluation for possible cystoscopy  Return visit in 4 weeks with Dr. Martha Doran          Patient Active Problem List   Diagnosis    AAA (abdominal aortic aneurysm) without rupture    Acute post-operative pain    Age-related nuclear cataract of both eyes    Atelectasis    Bilateral carotid artery stenosis    BPH with obstruction/lower urinary tract symptoms    Centrilobular emphysema ([address]    Current smoker    CARDOSO (dyspnea on exertion)    Edema, lower extremity    Elevated prostate specific antigen (PSA)    Essential hypertension    Exudative age-related macular degeneration of left eye ([address]    Anemia of renal disease    Stage 4 chronic kidney disease (HCC)    Hypertensive chronic kidney disease with stage 1 through stage 4 chronic kidney disease, or unspecified chronic kidney disease    Hyperuricemia    IFG (impaired fasting glucose)    Anemia, iron deficiency    Iron deficiency anemia secondary to inadequate dietary iron intake    Macular degeneration    Malignant neoplasm of prostate ([address] Hyperlipidemia    Mixed hyperlipidemia    Cerebellar stroke (HCC)    CVA (cerebral vascular accident) [facility]    PAD (peripheral artery disease) ([facility])    Pleural effusion    Primary open angle glaucoma (POAG) of both eyes, mild stage    Sleep pattern disturbance    Anemia of chronic renal failure    Hypertensive kidney disease with chronic kidney disease    Hypertensive kidney disease with CKD (chronic kidney disease), stage 5 chronic kidney disease or end stage renal disease (HCC)           Subjective:   Chief complaint:  Chief Complaint   Patient presents with    Other     Retaining water, fatigue, incontinence, should he take metolazone for a few days is this all part of kidney disease? Pt. Has been cleared for transplant just need to find out if wife is a match      HPI:This is a follow up visit for Mr. Claudean Stade who is here today for office appointment. We see him for chronic kidney disease. He was last seen about 4 weeks ago. Since then he had called the office with the symptoms of  UTI. He is currently being treated with amoxicillin antibiotic. He no longer has the symptoms of UTI now. He feels good. No chest pain. No shortness of breath. Appetite is good. Does have occasional nausea but no vomiting. ROS:  Pertinent positives stated above in HPI. All other systems were reviewed and were negative.   Medications:     Current Outpatient Medications   Medication Sig Dispense Refill    amLODIPine (NORVASC) 10 MG tablet Take 10 mg by mouth daily      aspirin 81 MG chewable tablet Take 81 mg by mouth      atorvastatin (LIPITOR) 40 MG tablet Take 40 mg by mouth daily      Cholecalciferol (VITAMIN D) 10 MCG (400 UNIT) CAPS Capsule Take 400 Units by mouth daily      clopidogrel (PLAVIX) 75 MG tablet Take 75 mg by mouth daily      febuxostat (ULORIC) 40 MG TABS tablet Take 40 mg by mouth daily      latanoprost (XALATAN) 0.005 % ophthalmic solution       MULTIPLE VITAMINS-MINERALS PO Take 1 tablet by mouth 2 times daily preservision      nebivolol (BYSTOLIC) 20 MG TABS tablet Take 20 mg by mouth daily      tamsulosin (FLOMAX) 0.4 MG capsule Take 0.4 mg by mouth daily      iron sucrose (VENOFER) 20 MG/ML injection Infuse 200 mg intravenously once a week      metOLazone (ZAROXOLYN) 5 MG tablet Take 1 tablet by mouth daily for 3 days (Patient not taking: Reported on 1/10/2023) 3 tablet 1    hydrALAZINE (APRESOLINE) 25 MG tablet Take 1 tablet by mouth in the morning and at bedtime 180 tablet 1    torsemide (DEMADEX) 20 MG tablet Take 2 tablets by mouth in the morning and at bedtime (Patient taking differently: Take 20 mg by mouth in the morning and at bedtime Pt. Adjusts as he feels is needed) 360 tablet 1     No current facility-administered medications for this visit.        Lab Results:    CBC:   Lab Results   Component Value Date    WBC 5.2 02/23/2023    HGB 9.3 (L) 02/23/2023    HCT 27.8 (L) 02/23/2023    MCV 85.2 02/23/2023     02/23/2023     BMP:    Lab Results   Component Value Date     (L) 02/09/2023     01/18/2023     01/06/2023    K 4.3 02/09/2023    K 5.1 01/18/2023    K 5.2 (H) 01/06/2023     02/09/2023     01/18/2023     (H) 01/06/2023    CO2 22 02/09/2023    CO2 20 (L) 01/18/2023    CO2 21 (L) 01/06/2023    BUN 91 (H) 02/09/2023    BUN 86 (H) 01/18/2023    BUN 86 (H) 01/06/2023    CREATININE 4.4 (HH) 02/09/2023    CREATININE 4.1 (HH) 01/18/2023    CREATININE 4.4 (HH) 01/06/2023    GLUCOSE 103 02/09/2023    GLUCOSE 91 01/18/2023    GLUCOSE 101 01/06/2023      Hepatic:   Lab Results   Component Value Date    AST 17 01/18/2023    ALT 19 01/18/2023    BILITOT 0.8 01/18/2023    ALKPHOS 118 01/18/2023     BNP: No results found for: BNP  Lipids:   Lab Results   Component Value Date    CHOL 87 01/18/2023    HDL 37 (L) 01/18/2023     INR: No results found for: INR  URINE:   Lab Results   Component Value Date/Time    PROTUR >=300 01/03/2023 03:15 PM     Lab Results   Component Value Date/Time    NITRU NEGATIVE 01/03/2023 03:15 PM    COLORU RED 01/03/2023 03:15 PM    LABCAST NONE SEEN 01/03/2023 03:15 PM    WBCUA 0-2 01/03/2023 03:15 PM    RBCUA  01/03/2023 03:15 PM    MUCUS NONE SEEN 01/03/2023 03:15 PM    BACTERIA NONE SEEN 01/03/2023 03:15 PM    CLARITYU CLEAR 01/03/2023 03:15 PM    SPECGRAV 1.010 01/03/2023 03:15 PM    LEUKOCYTESUR TRACE 01/03/2023 03:15 PM    UROBILINOGEN 0.2 01/03/2023 03:15 PM    BILIRUBINUR NEGATIVE 01/03/2023 03:15 PM    BLOODU LARGE 01/03/2023 03:15 PM    GLUCOSEU NEGATIVE 01/03/2023 03:15 PM    KETUA NEGATIVE 01/03/2023 03:15 PM      Microalbumen/Creatinine ratio:  No components found for: RUCREAT    Objective:   Vitals: /67 (Site: Left Upper Arm, Position: Sitting, Cuff Size: Small Adult)   Pulse 55   Ht 5' 11\" (1.803 m)   Wt 176 lb (79.8 kg)   SpO2 96%   BMI 24.55 kg/m²      Constitutional:  Alert, awake, no apparent distress  Skin:normal with no rash or any significant lesions  HEENT:Pupils are reactive . Throat is clear. Oral mucosa is moist.  Neck:supple with no thyromegaly, JVD, lymphadenopathy or bruit **  Cardiovascular: Regular sinus rhythm without murmur, rubs or gallops   Respiratory:  Clear to auscultation with no wheezes or rales  Abdomen: Good bowel sound, soft, non tender and no bruit  Ext: No LE edema  Musculoskeletal:Intact  Neuro:Alert, awake and oriented with no obvious focal deficit. Speech is normal.**    Electronically signed by Debra Patricia MD on 2/23/2023 at 3:58 PM   **This report has been created using voice recognition software. It maycontain minor  errors which are inherent in voice recognition technology. **

## 2023-03-09 LAB
BASOPHILS # BLD: 1.1 % (ref 0–3)
EOSINOPHIL # BLD: 8.5 % (ref 0–4)
HCT VFR BLD CALC: 27.4 % (ref 42–52)
HEMOGLOBIN: 9.1 G/DL (ref 14–18)
LYMPHOCYTES # BLD: 5.6 % (ref 17.6–49.6)
MCH RBC QN AUTO: 28 PG (ref 28–32)
MCHC RBC AUTO-ENTMCNC: 33.3 G/DL (ref 33–37)
MCV RBC AUTO: 84.1 FL (ref 80–94)
MONOCYTES # BLD: 9.4 % (ref 4.1–12.4)
PDW BLD-RTO: 15.8 % (ref 11.5–14.5)
PLATELET # BLD: 186 THOU/CUMM (ref 130–400)
RBC: 3.26 MIL/CUMM (ref 4.7–6.1)
SCAN OF BLOOD SMEAR: NO
SEG NEUTROPHILS: 75.4 % (ref 39.4–72.5)
WBC: 5.3 THOU/CUMM (ref 4.8–10.8)

## 2023-03-13 ENCOUNTER — TELEPHONE (OUTPATIENT)
Dept: NEPHROLOGY | Age: 68
End: 2023-03-13

## 2023-03-13 DIAGNOSIS — N18.5 ANEMIA DUE TO STAGE 5 CHRONIC KIDNEY DISEASE (HCC): Primary | ICD-10-CM

## 2023-03-13 DIAGNOSIS — D63.1 ANEMIA DUE TO STAGE 5 CHRONIC KIDNEY DISEASE (HCC): Primary | ICD-10-CM

## 2023-03-13 NOTE — TELEPHONE ENCOUNTER
----- Message from Darci Belcher DO sent at 3/13/2023 11:29 AM EDT -----  How much Aranesp is he getting? We probably need to increase it. Also need an iron saturation and ferritin for his next labs if not already ordered.

## 2023-03-14 ENCOUNTER — TELEPHONE (OUTPATIENT)
Dept: NEPHROLOGY | Age: 68
End: 2023-03-14

## 2023-03-17 ENCOUNTER — TELEPHONE (OUTPATIENT)
Dept: NEPHROLOGY | Age: 68
End: 2023-03-17

## 2023-03-17 NOTE — TELEPHONE ENCOUNTER
Patient's wife called to see if there were any directives after viewing recent lab results. Also, she states he has no swelling but his breathing seems to be a little worse.

## 2023-03-20 NOTE — TELEPHONE ENCOUNTER
Call placed to [facility] for injectafer. I have a feeling they will prefer venofer. Just a heads up.

## 2023-03-21 NOTE — TELEPHONE ENCOUNTER
Venofer approved and faxed order with approval note to Otilio at [facility]. Will notify Tom Malik or Mir Noriega regarding eduar.

## 2023-03-23 LAB
BASOPHILS # BLD: 1.9 % (ref 0–3)
EOSINOPHIL # BLD: 9.4 % (ref 0–4)
HCT VFR BLD CALC: 28.3 % (ref 42–52)
HEMOGLOBIN: 9.4 G/DL (ref 14–18)
LYMPHOCYTES # BLD: 7.6 % (ref 17.6–49.6)
MCH RBC QN AUTO: 27.8 PG (ref 28–32)
MCHC RBC AUTO-ENTMCNC: 33.1 G/DL (ref 33–37)
MCV RBC AUTO: 83.9 FL (ref 80–94)
MONOCYTES # BLD: 11.9 % (ref 4.1–12.4)
PDW BLD-RTO: 16.5 % (ref 11.5–14.5)
PLATELET # BLD: 189 THOU/CUMM (ref 130–400)
RBC: 3.37 MIL/CUMM (ref 4.7–6.1)
SCAN OF BLOOD SMEAR: NO
SEG NEUTROPHILS: 69.2 % (ref 39.4–72.5)
WBC: 5.6 THOU/CUMM (ref 4.8–10.8)

## 2023-03-27 ENCOUNTER — OFFICE VISIT (OUTPATIENT)
Dept: NEPHROLOGY | Age: 68
End: 2023-03-27
Payer: COMMERCIAL

## 2023-03-27 VITALS
WEIGHT: 174 LBS | OXYGEN SATURATION: 97 % | SYSTOLIC BLOOD PRESSURE: 149 MMHG | BODY MASS INDEX: 24.27 KG/M2 | HEART RATE: 62 BPM | DIASTOLIC BLOOD PRESSURE: 63 MMHG

## 2023-03-27 DIAGNOSIS — D63.1 ANEMIA DUE TO STAGE 5 CHRONIC KIDNEY DISEASE (HCC): ICD-10-CM

## 2023-03-27 DIAGNOSIS — N18.5 ANEMIA DUE TO STAGE 5 CHRONIC KIDNEY DISEASE (HCC): ICD-10-CM

## 2023-03-27 DIAGNOSIS — N18.5 CKD (CHRONIC KIDNEY DISEASE) STAGE 5, GFR LESS THAN 15 ML/MIN (HCC): Primary | ICD-10-CM

## 2023-03-27 PROCEDURE — 3078F DIAST BP <80 MM HG: CPT | Performed by: INTERNAL MEDICINE

## 2023-03-27 PROCEDURE — 99214 OFFICE O/P EST MOD 30 MIN: CPT | Performed by: INTERNAL MEDICINE

## 2023-03-27 PROCEDURE — 3077F SYST BP >= 140 MM HG: CPT | Performed by: INTERNAL MEDICINE

## 2023-03-27 PROCEDURE — 1123F ACP DISCUSS/DSCN MKR DOCD: CPT | Performed by: INTERNAL MEDICINE

## 2023-03-27 NOTE — PROGRESS NOTES
[address] KIDNEY AND HYPERTENSION  [address]  [facility] [address]  Dept: [phone]  Loc: [phone]  Progress Note  3/27/2023 2:47 PM      Pt Name:    Nyla Broussard  YOB: 1955  Primary Care Physician:  Carl Jonas MD     Chief Complaint:   Chief Complaint   Patient presents with    Follow-up     CKD V         History of Present Illness: This is a follow-up visit for CKD IV/V. He has a history of HFmrEF (40%), anemia, prostate CA s/p RT November 2021, carotid artery stenosis s/p endarterectomy 2012, and AAA s/p EVAR in 2018. He had previous duplex renal artery US which showed no renal artery stenosis. Kidneys normal size with multiple renal cysts B/L. Accompanied by his wife. He is on transplant list at [facility] [place][note 2], his wife is a match and undergoing further work up.   + fatigue  + CARDOSO. Swelling ok, weights stable. Good appetite  -taking torsemide once daily 20 mg, will occasionally take extra dose. Pertinent items are noted in HPI. Past History:  No past medical history on file. No past surgical history on file. VITALS:  BP (!) 149/63 (Site: Left Upper Arm, Position: Sitting, Cuff Size: Large Adult)   Pulse 62   Wt 174 lb (78.9 kg)   SpO2 97%   BMI 24.27 kg/m²   Wt Readings from Last 3 Encounters:   03/27/23 174 lb (78.9 kg)   02/23/23 176 lb (79.8 kg)   01/10/23 169 lb (76.7 kg)     Body mass index is 24.27 kg/m².      General Appearance: alert and cooperative with exam, appears comfortable, no distress  HEENT: EOMI, moist oral mucus membranes  Neck: No jugular venous distention,  Lungs: Air entry B/L, no crackles or rales, no use of accessory muscles  Heart: S1, S2 heard, no rub  GI: soft, non-tender, no guarding  Extremities: trace ankle edema  Skin: warm, dry  Neurologic: no tremor, no asterixis,     Medications:  Current Outpatient Medications   Medication Sig Dispense Refill

## 2023-04-06 LAB
HCT VFR BLD CALC: 30.4 % (ref 42–52)
HEMOGLOBIN: 10.2 G/DL (ref 14–18)

## 2023-04-06 RX ORDER — HYDRALAZINE HYDROCHLORIDE 25 MG/1
TABLET, FILM COATED ORAL
Qty: 180 TABLET | Refills: 1 | Status: SHIPPED | OUTPATIENT
Start: 2023-04-06

## 2023-04-20 LAB
HCT VFR BLD CALC: 32.6 % (ref 42–52)
HEMOGLOBIN: 10.6 G/DL (ref 14–18)

## 2023-05-04 LAB
HCT VFR BLD CALC: 31.6 % (ref 42–52)
HEMOGLOBIN: 10.3 G/DL (ref 14–18)

## 2023-05-05 ENCOUNTER — TELEPHONE (OUTPATIENT)
Dept: NEPHROLOGY | Age: 68
End: 2023-05-05

## 2023-05-05 DIAGNOSIS — R31.0 GROSS HEMATURIA: Primary | ICD-10-CM

## 2023-05-05 DIAGNOSIS — N39.0 RECURRENT UTI: Primary | ICD-10-CM

## 2023-05-05 LAB
BACTERIA, URINE: NORMAL
BILIRUBIN URINE: NORMAL
BLOOD, URINE: NORMAL
CASTS: NORMAL /LPF
CLARITY: NORMAL
COLOR, URINE: NORMAL
CRYSTALS: NORMAL
GLUCOSE URINE: NEGATIVE MG/DL
KETONES, URINE: NEGATIVE MG/DL
LEUKOCYTES, UA: NEGATIVE
MICROSCOPIC URINE: YES
MUCUS, URINE: NORMAL
NITRITE, URINE: NEGATIVE
PH, URINE: 5.5 (ref 5–8)
PROTEIN, URINE: >=300 MG/DL
RBC URINE: >100 /HPF
SPECIFIC GRAVITY UA: 1.01 (ref 1.01–1.02)
SQUAMOUS EPITHELIAL: NORMAL /HPF
URINE CULTURE INDICATED: YES
UROBILINOGEN, URINE: 0.2 E.U./DL (ref 0.2–1)
WBC URINE: NORMAL /HPF (ref 0–5)

## 2023-05-07 LAB — URINE CULTURE, ROUTINE: NORMAL

## 2023-05-08 ENCOUNTER — TELEPHONE (OUTPATIENT)
Dept: NEPHROLOGY | Age: 68
End: 2023-05-08

## 2023-05-08 RX ORDER — TORSEMIDE 20 MG/1
TABLET ORAL
Qty: 360 TABLET | Refills: 1 | Status: SHIPPED | OUTPATIENT
Start: 2023-05-08

## 2023-05-08 NOTE — TELEPHONE ENCOUNTER
Lm Asking Cathryn Forte how pt is doing and if he has blood in the urine. If he does she wants to prescribe Send in amoxicillin 500 mg bid x 7 days.  If the antibiotic does not help then he needs to notify urologist

## 2023-05-08 NOTE — TELEPHONE ENCOUNTER
----- Message from Shahram Alcazar DO sent at 5/8/2023 12:37 PM EDT -----  How is patient doing, does he still have blood in his urine? I reviewed his urine results and there does not appear to be an infection, however if he still has blood in the urine then will give him a trial of antibiotics. Send in amoxicillin 500 mg bid x 7 days if still blood in the urine.   ( Let me know)  Also if the antibiotic does not help then needs to notify his urologist

## 2024-10-07 ENCOUNTER — OFFICE VISIT (OUTPATIENT)
Dept: NEPHROLOGY | Age: 69
End: 2024-10-07
Payer: MEDICARE

## 2024-10-07 VITALS
SYSTOLIC BLOOD PRESSURE: 140 MMHG | OXYGEN SATURATION: 98 % | DIASTOLIC BLOOD PRESSURE: 69 MMHG | BODY MASS INDEX: 24.69 KG/M2 | WEIGHT: 177 LBS

## 2024-10-07 DIAGNOSIS — Z94.0 KIDNEY TRANSPLANT RECIPIENT: ICD-10-CM

## 2024-10-07 DIAGNOSIS — N18.32 STAGE 3B CHRONIC KIDNEY DISEASE (HCC): ICD-10-CM

## 2024-10-07 DIAGNOSIS — R33.9 URINARY RETENTION: Primary | ICD-10-CM

## 2024-10-07 PROCEDURE — 99214 OFFICE O/P EST MOD 30 MIN: CPT | Performed by: INTERNAL MEDICINE

## 2024-10-07 PROCEDURE — 3078F DIAST BP <80 MM HG: CPT | Performed by: INTERNAL MEDICINE

## 2024-10-07 PROCEDURE — 3077F SYST BP >= 140 MM HG: CPT | Performed by: INTERNAL MEDICINE

## 2024-10-07 PROCEDURE — 1123F ACP DISCUSS/DSCN MKR DOCD: CPT | Performed by: INTERNAL MEDICINE

## 2024-10-07 RX ORDER — TACROLIMUS 1 MG/1
3 CAPSULE ORAL DAILY
COMMUNITY
Start: 2024-04-12

## 2024-10-07 RX ORDER — METOPROLOL SUCCINATE 25 MG/1
25 TABLET, EXTENDED RELEASE ORAL DAILY
COMMUNITY

## 2024-10-07 RX ORDER — PREDNISONE 5 MG/1
5 TABLET ORAL DAILY
COMMUNITY
Start: 2024-03-22

## 2024-10-07 RX ORDER — SULFAMETHOXAZOLE AND TRIMETHOPRIM 400; 80 MG/1; MG/1
1 TABLET ORAL
COMMUNITY
Start: 2024-08-28 | End: 2024-11-26

## 2024-10-07 RX ORDER — DORZOLAMIDE HYDROCHLORIDE AND TIMOLOL MALEATE 20; 5 MG/ML; MG/ML
1 SOLUTION/ DROPS OPHTHALMIC 2 TIMES DAILY
COMMUNITY
Start: 2024-01-22

## 2024-10-07 RX ORDER — MYCOPHENOLATE MOFETIL 250 MG/1
1000 CAPSULE ORAL 2 TIMES DAILY
COMMUNITY
Start: 2024-09-19

## 2024-10-07 NOTE — PROGRESS NOTES
[facility] [place] [facility]  [facility]  [address]  [facility] [address]  Dept: [phone]  Loc: [phone]  Progress Note  10/7/2024 12:52 PM      Pt Name:    Greg Wren  YOB: 1955  Primary Care Physician:  Cuong Chahal MD     Chief Complaint:   Chief Complaint   Patient presents with    Follow-up        History of Present Illness:   This is a follow-up visit for CKD, renal transplant. First time I'm seeing him since he underwent preemptive living unrelating (wife) transplant on 5/17/2023.  CKD V from HTN. He has a history of HFmrEF (40%), anemia, prostate CA s/p RT November 2021, carotid artery stenosis s/p endarterectomy 2012, and AAA s/p EVAR in 2018.     Since transplant creatinine ranging 1.7-1.9 mg/dL.  He is here due to concerns of urinary retention, ongoing for about the past 3 weeks.   Feels like he doesn't empty and then goes again about 10 minutes after.   Has nocturia x 2.   Feels like he might have some mild dysuria.               Pertinent items are noted in HPI.         Past History:  No past medical history on file.  No past surgical history on file.     VITALS:  BP (!) 140/69 (Site: Right Upper Arm, Position: Sitting, Cuff Size: Large Adult)   Wt 80.3 kg (177 lb)   SpO2 98%   BMI 24.69 kg/m²   Wt Readings from Last 3 Encounters:   10/07/24 80.3 kg (177 lb)   03/27/23 78.9 kg (174 lb)   02/23/23 79.8 kg (176 lb)     Body mass index is 24.69 kg/m².     General Appearance: alert and cooperative with exam, appears comfortable, no distress  HEENT: EOMI, moist oral mucus membranes  Neck: No jugular venous distention,  Lungs: Air entry B/L, no crackles or rales, no use of accessory muscles  Heart: S1, S2 heard, no rub  GI: soft, non-tender, no guarding  Extremities:no leg edema  Skin: warm, dry  Neurologic: no tremor, no asterixis,     Medications:  Current Outpatient Medications   Medication Sig Dispense Refill

## 2024-10-09 ENCOUNTER — TELEPHONE (OUTPATIENT)
Dept: NEPHROLOGY | Age: 69
End: 2024-10-09

## 2024-10-09 NOTE — TELEPHONE ENCOUNTER
----- Message from Dr. Marycruz Mensah DO sent at 10/9/2024 12:31 PM EDT -----  UA reviewed - does not look too concerning for an infection but there is some microscopic blood.  Hopefully can see urology soon.   I don't see the final culture result?  Would hold off on any antibiotics unless the culture is growing something.  ----- Message -----  From: Yanelis Perla CMA  Sent: 10/9/2024   9:14 AM EDT  To: Marycruz Mensah DO

## 2024-10-10 NOTE — TELEPHONE ENCOUNTER
Patients wife called in and I notified her that it does not look too concerning for an infection but there is some microscopic blood. She said they will try to get into Urology. I told her the final culture result is still pending and would hold off on antibiotics for now.

## 2024-10-14 NOTE — TELEPHONE ENCOUNTER
I called Shavon and let her know that there was not a culture ran. She said okay she will find out from [facility] what to do.

## 2024-10-14 NOTE — TELEPHONE ENCOUNTER
Shavon called wanting the culture result. They are on their way to [facility] and have not heard anything about the culture. I let her know it did not look like one was done. I let her know I would call [facility] and see. She said okay but [facility] is going to want to know the results of the culture.

## 2024-10-14 NOTE — TELEPHONE ENCOUNTER
I called [facility]. I spoke to Kary and she said the urine did not need cultured. She said it was ordered as urinalysis with reflex to culture. So the urine did not reflex to do a culture.

## 2025-04-09 ENCOUNTER — OFFICE VISIT (OUTPATIENT)
Dept: NEPHROLOGY | Age: 70
End: 2025-04-09

## 2025-04-09 VITALS
HEART RATE: 84 BPM | DIASTOLIC BLOOD PRESSURE: 80 MMHG | BODY MASS INDEX: 24.97 KG/M2 | WEIGHT: 179 LBS | OXYGEN SATURATION: 98 % | SYSTOLIC BLOOD PRESSURE: 130 MMHG

## 2025-04-09 DIAGNOSIS — N18.32 STAGE 3B CHRONIC KIDNEY DISEASE (HCC): ICD-10-CM

## 2025-04-09 DIAGNOSIS — Z94.0 KIDNEY TRANSPLANT RECIPIENT: Primary | ICD-10-CM

## 2025-04-09 DIAGNOSIS — N18.5 CKD (CHRONIC KIDNEY DISEASE) STAGE 5, GFR LESS THAN 15 ML/MIN (HCC): ICD-10-CM

## 2025-04-09 NOTE — PROGRESS NOTES
[facility] [place] [facility]  [facility]  [address]  [address]  [facility] [address]  Dept: [phone]  Loc: [phone]  Progress Note  4/9/2025 10:32 AM      Pt Name:    Greg Wren  YOB: 1955  Primary Care Physician:  Cuong Chahal MD     Chief Complaint:   Chief Complaint   Patient presents with    Follow-up     CKD V         History of Present Illness:   This is a follow-up visit for CKD, renal transplant. He is  s/p preemptive living unrelating (wife) transplant on 5/17/2023.  CKD V from HTN. He has a history of HFmrEF (40%), anemia, prostate CA s/p RT November 2021, carotid artery stenosis s/p endarterectomy 2012, and AAA s/p EVAR in 2018 and recently diagnosed bladder CA    Since transplant creatinine ranging 1.7-1.9 mg/dL.      Since last visit was diagnosed with bladder CA s/p resection and he received intravesicular gemzar and taxotere which he will be continuing once a month for a year.  Is having urinary incontinence since then.   Transplant is considering changing him to MTOR inhibitor if DSA is negative.           Pertinent items are noted in HPI.         Past History:  No past medical history on file.  No past surgical history on file.     VITALS:  /80 (BP Site: Left Upper Arm, Patient Position: Sitting, BP Cuff Size: Large Adult)   Pulse 84   Wt 81.2 kg (179 lb)   SpO2 98%   BMI 24.97 kg/m²   Wt Readings from Last 3 Encounters:   04/09/25 81.2 kg (179 lb)   10/07/24 80.3 kg (177 lb)   03/27/23 78.9 kg (174 lb)     Body mass index is 24.97 kg/m².     General Appearance: alert and cooperative with exam, appears comfortable, no distress  HEENT: EOMI, moist oral mucus membranes  Neck: No jugular venous distention,  Lungs: Air entry B/L, no crackles or rales, no use of accessory muscles  Heart: S1, S2 heard, no rub  GI: soft, non-tender, no guarding  Extremities:no leg edema  Skin: warm, dry  Neurologic: no tremor, no